# Patient Record
Sex: FEMALE | URBAN - METROPOLITAN AREA
[De-identification: names, ages, dates, MRNs, and addresses within clinical notes are randomized per-mention and may not be internally consistent; named-entity substitution may affect disease eponyms.]

---

## 2017-03-04 ENCOUNTER — EMERGENCY (EMERGENCY)
Facility: HOSPITAL | Age: 82
LOS: 1 days | Discharge: HOME | End: 2017-03-04
Attending: EMERGENCY MEDICINE | Admitting: EMERGENCY MEDICINE
Payer: MEDICARE

## 2017-03-04 VITALS
DIASTOLIC BLOOD PRESSURE: 84 MMHG | RESPIRATION RATE: 18 BRPM | SYSTOLIC BLOOD PRESSURE: 147 MMHG | OXYGEN SATURATION: 95 % | HEART RATE: 80 BPM | TEMPERATURE: 98 F

## 2017-03-04 VITALS
DIASTOLIC BLOOD PRESSURE: 81 MMHG | HEART RATE: 74 BPM | SYSTOLIC BLOOD PRESSURE: 161 MMHG | TEMPERATURE: 97 F | RESPIRATION RATE: 18 BRPM | OXYGEN SATURATION: 95 %

## 2017-03-04 DIAGNOSIS — Z79.899 OTHER LONG TERM (CURRENT) DRUG THERAPY: ICD-10-CM

## 2017-03-04 DIAGNOSIS — F91.8 OTHER CONDUCT DISORDERS: ICD-10-CM

## 2017-03-04 DIAGNOSIS — Z79.2 LONG TERM (CURRENT) USE OF ANTIBIOTICS: ICD-10-CM

## 2017-03-04 LAB
APPEARANCE UR: CLEAR — SIGNIFICANT CHANGE UP
BILIRUB UR-MCNC: NEGATIVE — SIGNIFICANT CHANGE UP
COLOR SPEC: YELLOW — SIGNIFICANT CHANGE UP
DIFF PNL FLD: NEGATIVE — SIGNIFICANT CHANGE UP
GLUCOSE UR QL: NEGATIVE — SIGNIFICANT CHANGE UP
KETONES UR-MCNC: NEGATIVE — SIGNIFICANT CHANGE UP
LEUKOCYTE ESTERASE UR-ACNC: NEGATIVE — SIGNIFICANT CHANGE UP
NITRITE UR-MCNC: NEGATIVE — SIGNIFICANT CHANGE UP
PCP SPEC-MCNC: SIGNIFICANT CHANGE UP
PH UR: 5.5 — SIGNIFICANT CHANGE UP (ref 4–8)
PROT UR-MCNC: NEGATIVE MG/DL — SIGNIFICANT CHANGE UP
SP GR SPEC: 1.01 — SIGNIFICANT CHANGE UP (ref 1–1.03)
UROBILINOGEN FLD QL: 0.2 E.U./DL — SIGNIFICANT CHANGE UP

## 2017-03-04 PROCEDURE — 99283 EMERGENCY DEPT VISIT LOW MDM: CPT | Mod: 25

## 2017-03-04 PROCEDURE — 99284 EMERGENCY DEPT VISIT MOD MDM: CPT

## 2017-03-04 PROCEDURE — 93005 ELECTROCARDIOGRAM TRACING: CPT

## 2017-03-04 RX ORDER — AMPICILLIN SODIUM AND SULBACTAM SODIUM 250; 125 MG/ML; MG/ML
INJECTION, POWDER, FOR SUSPENSION INTRAMUSCULAR; INTRAVENOUS
Qty: 0 | Refills: 0 | Status: DISCONTINUED | OUTPATIENT
Start: 2017-03-04 | End: 2017-03-04

## 2017-03-04 RX ORDER — AMPICILLIN SODIUM AND SULBACTAM SODIUM 250; 125 MG/ML; MG/ML
3 INJECTION, POWDER, FOR SUSPENSION INTRAMUSCULAR; INTRAVENOUS ONCE
Qty: 0 | Refills: 0 | Status: DISCONTINUED | OUTPATIENT
Start: 2017-03-04 | End: 2017-03-04

## 2017-03-04 NOTE — ED ADULT NURSE REASSESSMENT NOTE - NS ED NURSE REASSESS COMMENT FT1
Pt's home aide at bedside since 1700.  Ambulate to take pt home.  Ambulatory with even gait.  Pt at baseline status.

## 2017-03-04 NOTE — ED ADULT TRIAGE NOTE - CHIEF COMPLAINT QUOTE
Pt BIBA from home for aggressive behavior. As per EMS, pt has hx of dementia and 24hr home aide who pt was arguing with this morning. Pt is awake and oriented to self and place. Pt denies chest pain, dizziness, SOB. Pt taken to bed 18 for FS.

## 2017-03-04 NOTE — ED ADULT NURSE NOTE - OBJECTIVE STATEMENT
Pt BIBA for behavorial changes this morning. Per EMS 24 hours home health aide called 911 after Pt started cursing at aide and refused to eat.  Pt has hx of alzheimers and is baseline confused.  Pt is alert to person, place, not time.  Pt currently cooperative with ED staff, anxious and cursing in regards to her aide.  Pt currently denies any medical complaints.  Denies chest pain, SOB, abd pain, /GI symptoms, D/N/V.  Strong, equal handgrips.  Positive PMS x4 extremities. Pt BIBA for behavorial changes this morning. Per EMS 24 hours home health aide called 911 after Pt started cursing at aide and refused to eat.  Per EMS, aid states Pt has hx of alzheimer's disease and is baseline confused.  Pt is alert to person, place, not time.  Pt currently cooperative with ED staff, anxious and cursing in regards to her aide.  Pt currently denies any medical complaints.  Denies chest pain, SOB, abd pain, /GI symptoms, D/N/V.  Strong, equal hand .  Positive PMS x4 extremities.  Pt states "I threw a tantrum." Pt BIBA for behavorial changes this morning. Per EMS 24 hours home health aide called 911 after Pt started cursing at aide and refused to eat.  Per EMS, aid states Pt has hx of alzheimer's disease and is baseline confused.  Pt is alert to person, place, not time.  Pt currently cooperative with ED staff, anxious and cursing in regards to her aide.  Pt currently denies any medical complaints.  Denies chest pain, SOB, abd pain, /GI symptoms, D/N/V.  Strong, equal hand .  Positive PMS x4 extremities.  Pt states "I threw a tantrum."  Per EMS, aide refused to escort pt to ED.

## 2017-03-04 NOTE — ED PROVIDER NOTE - OBJECTIVE STATEMENT
89 yo with complaints of not getting along with aide 89 yo bibems with complaints of not getting along with aide this morning who is not regular aide during the week. Pt with no somatic complaints, now aide that is on evening shift states that pt is acting appropriately and is not combative and may have been because it was a not a regular aide. Pt denies shob, cp, abd pain and states otherwise feels fine, no hi/si, hallucinations.

## 2017-03-04 NOTE — ED PROVIDER NOTE - PHYSICAL EXAMINATION
CONSTITUTIONAL: Well appearing, well nourished, awake, alert and in no apparent distress.  ENMT: Airway patent.  HEAD: Head is atraumatic. Head shape is symmetrical.  EYES: Clear bilaterally. Normal EOMI.  CARDIAC: Normal rate, regular rhythm.  Heart sounds S1, S2.   RESPIRATORY: Breath sounds clear and equal bilaterally.  GASTROINTESTINAL: Abdomen soft, non-tender, no guarding.  MUSCULOSKELETAL: Spine appears normal, range of motion is not limited, no muscle or joint tenderness.   NEUROLOGICAL: Alert and oriented, no focal deficits, no motor or sensory deficits.  SKIN: Skin normal color for race, warm, dry and intact. No evidence of rash.  PSYCHIATRIC: Alert and oriented to person, place, time/situation. normal mood and affect. no apparent risk to self or others. not aggressive when talking to me or aide now. Does not appear altered/delirious.

## 2017-03-04 NOTE — ED ADULT NURSE NOTE - CHPI ED SYMPTOMS NEG
no fever/no decreased eating/drinking/no numbness/no pain/no chills/no nausea/no vomiting/no dizziness/no tingling/no weakness

## 2017-03-04 NOTE — ED PROVIDER NOTE - MEDICAL DECISION MAKING DETAILS
non toxic appearing, no somatic complaints, no SI/HI, not aggressive on my exam, refusing bld work initially, will defer for now. UA neg for UTI. Edenilson Acosta, will f/u with pt on Monday at 12p, no acute indication for psych eval now, Pt being brought to ED after not getting along with aide at home. Aide per current aide in ED is not there regularly. non toxic appearing, no somatic complaints, no SI/HI, not aggressive on my exam, refusing bld work initially, will defer for now. UA neg for UTI. Edenilson Acosta, will f/u with pt on Monday at 12p and pt is being dc'd with aide who is a regular.

## 2017-06-06 NOTE — ED ADULT NURSE NOTE - PRO INTERPRETER NEED 2
SUBJECTIVE: 76 year old female here for removal of sutures. Laceration  of RIGHT Foot occurred 12 days ago.  Sutures put in at [facility]. No   problems since then.     Nurse's notes reviewed and agree.      OBJECTIVE: Wound healed well. Sutures removed without complication.    ASSESSMENT: Suture Removal    PLAN: STERI STRIPS Applied and wound care instructions were provided. Follow up as needed.     English

## 2017-06-09 ENCOUNTER — EMERGENCY (EMERGENCY)
Facility: HOSPITAL | Age: 82
LOS: 1 days | Discharge: HOME | End: 2017-06-09
Attending: EMERGENCY MEDICINE | Admitting: EMERGENCY MEDICINE
Payer: MEDICARE

## 2017-06-09 VITALS
RESPIRATION RATE: 18 BRPM | SYSTOLIC BLOOD PRESSURE: 133 MMHG | OXYGEN SATURATION: 95 % | TEMPERATURE: 98 F | HEART RATE: 78 BPM | DIASTOLIC BLOOD PRESSURE: 80 MMHG

## 2017-06-09 DIAGNOSIS — Z79.2 LONG TERM (CURRENT) USE OF ANTIBIOTICS: ICD-10-CM

## 2017-06-09 DIAGNOSIS — R10.30 LOWER ABDOMINAL PAIN, UNSPECIFIED: ICD-10-CM

## 2017-06-09 DIAGNOSIS — Z79.899 OTHER LONG TERM (CURRENT) DRUG THERAPY: ICD-10-CM

## 2017-06-09 LAB
ALBUMIN SERPL ELPH-MCNC: 3.5 G/DL — SIGNIFICANT CHANGE UP (ref 3.3–5)
ALP SERPL-CCNC: 51 U/L — SIGNIFICANT CHANGE UP (ref 40–120)
ALT FLD-CCNC: 19 U/L — SIGNIFICANT CHANGE UP (ref 10–45)
ANION GAP SERPL CALC-SCNC: 11 MMOL/L — SIGNIFICANT CHANGE UP (ref 5–17)
APPEARANCE UR: CLEAR — SIGNIFICANT CHANGE UP
AST SERPL-CCNC: 23 U/L — SIGNIFICANT CHANGE UP (ref 10–40)
BASOPHILS NFR BLD AUTO: 0.3 % — SIGNIFICANT CHANGE UP (ref 0–2)
BILIRUB SERPL-MCNC: 0.3 MG/DL — SIGNIFICANT CHANGE UP (ref 0.2–1.2)
BILIRUB UR-MCNC: NEGATIVE — SIGNIFICANT CHANGE UP
BUN SERPL-MCNC: 17 MG/DL — SIGNIFICANT CHANGE UP (ref 7–23)
CALCIUM SERPL-MCNC: 9.2 MG/DL — SIGNIFICANT CHANGE UP (ref 8.4–10.5)
CHLORIDE SERPL-SCNC: 99 MMOL/L — SIGNIFICANT CHANGE UP (ref 96–108)
CO2 SERPL-SCNC: 27 MMOL/L — SIGNIFICANT CHANGE UP (ref 22–31)
COLOR SPEC: YELLOW — SIGNIFICANT CHANGE UP
CREAT SERPL-MCNC: 0.7 MG/DL — SIGNIFICANT CHANGE UP (ref 0.5–1.3)
DIFF PNL FLD: NEGATIVE — SIGNIFICANT CHANGE UP
EOSINOPHIL NFR BLD AUTO: 1 % — SIGNIFICANT CHANGE UP (ref 0–6)
GLUCOSE SERPL-MCNC: 82 MG/DL — SIGNIFICANT CHANGE UP (ref 70–99)
GLUCOSE UR QL: NEGATIVE — SIGNIFICANT CHANGE UP
HCT VFR BLD CALC: 42.4 % — SIGNIFICANT CHANGE UP (ref 34.5–45)
HGB BLD-MCNC: 13.8 G/DL — SIGNIFICANT CHANGE UP (ref 11.5–15.5)
KETONES UR-MCNC: NEGATIVE — SIGNIFICANT CHANGE UP
LEUKOCYTE ESTERASE UR-ACNC: NEGATIVE — SIGNIFICANT CHANGE UP
LYMPHOCYTES # BLD AUTO: 22.6 % — SIGNIFICANT CHANGE UP (ref 13–44)
MCHC RBC-ENTMCNC: 29.4 PG — SIGNIFICANT CHANGE UP (ref 27–34)
MCHC RBC-ENTMCNC: 32.5 G/DL — SIGNIFICANT CHANGE UP (ref 32–36)
MCV RBC AUTO: 90.2 FL — SIGNIFICANT CHANGE UP (ref 80–100)
MONOCYTES NFR BLD AUTO: 8.7 % — SIGNIFICANT CHANGE UP (ref 2–14)
NEUTROPHILS NFR BLD AUTO: 67.4 % — SIGNIFICANT CHANGE UP (ref 43–77)
NITRITE UR-MCNC: NEGATIVE — SIGNIFICANT CHANGE UP
PH UR: 7 — SIGNIFICANT CHANGE UP (ref 5–8)
PLATELET # BLD AUTO: 248 K/UL — SIGNIFICANT CHANGE UP (ref 150–400)
POTASSIUM SERPL-MCNC: 4.5 MMOL/L — SIGNIFICANT CHANGE UP (ref 3.5–5.3)
POTASSIUM SERPL-SCNC: 4.5 MMOL/L — SIGNIFICANT CHANGE UP (ref 3.5–5.3)
PROT SERPL-MCNC: 6.3 G/DL — SIGNIFICANT CHANGE UP (ref 6–8.3)
PROT UR-MCNC: NEGATIVE MG/DL — SIGNIFICANT CHANGE UP
RBC # BLD: 4.7 M/UL — SIGNIFICANT CHANGE UP (ref 3.8–5.2)
RBC # FLD: 14.3 % — SIGNIFICANT CHANGE UP (ref 10.3–16.9)
SODIUM SERPL-SCNC: 137 MMOL/L — SIGNIFICANT CHANGE UP (ref 135–145)
SP GR SPEC: <=1.005 — SIGNIFICANT CHANGE UP (ref 1–1.03)
UROBILINOGEN FLD QL: 0.2 E.U./DL — SIGNIFICANT CHANGE UP
WBC # BLD: 6.1 K/UL — SIGNIFICANT CHANGE UP (ref 3.8–10.5)
WBC # FLD AUTO: 6.1 K/UL — SIGNIFICANT CHANGE UP (ref 3.8–10.5)

## 2017-06-09 PROCEDURE — 81003 URINALYSIS AUTO W/O SCOPE: CPT

## 2017-06-09 PROCEDURE — 36415 COLL VENOUS BLD VENIPUNCTURE: CPT

## 2017-06-09 PROCEDURE — 84484 ASSAY OF TROPONIN QUANT: CPT

## 2017-06-09 PROCEDURE — 99284 EMERGENCY DEPT VISIT MOD MDM: CPT | Mod: 25

## 2017-06-09 PROCEDURE — 71010: CPT | Mod: 26

## 2017-06-09 PROCEDURE — 82550 ASSAY OF CK (CPK): CPT

## 2017-06-09 PROCEDURE — 74176 CT ABD & PELVIS W/O CONTRAST: CPT | Mod: 26

## 2017-06-09 PROCEDURE — 74176 CT ABD & PELVIS W/O CONTRAST: CPT

## 2017-06-09 PROCEDURE — 99284 EMERGENCY DEPT VISIT MOD MDM: CPT

## 2017-06-09 PROCEDURE — 85025 COMPLETE CBC W/AUTO DIFF WBC: CPT

## 2017-06-09 PROCEDURE — 82553 CREATINE MB FRACTION: CPT

## 2017-06-09 PROCEDURE — 71045 X-RAY EXAM CHEST 1 VIEW: CPT

## 2017-06-09 PROCEDURE — 80053 COMPREHEN METABOLIC PANEL: CPT

## 2017-06-09 RX ORDER — SODIUM CHLORIDE 9 MG/ML
1000 INJECTION INTRAMUSCULAR; INTRAVENOUS; SUBCUTANEOUS ONCE
Qty: 0 | Refills: 0 | Status: COMPLETED | OUTPATIENT
Start: 2017-06-09 | End: 2017-06-09

## 2017-06-09 RX ORDER — IOHEXOL 300 MG/ML
50 INJECTION, SOLUTION INTRAVENOUS ONCE
Qty: 0 | Refills: 0 | Status: COMPLETED | OUTPATIENT
Start: 2017-06-09 | End: 2017-06-09

## 2017-06-09 RX ADMIN — IOHEXOL 50 MILLILITER(S): 300 INJECTION, SOLUTION INTRAVENOUS at 20:44

## 2017-06-09 RX ADMIN — SODIUM CHLORIDE 1000 MILLILITER(S): 9 INJECTION INTRAMUSCULAR; INTRAVENOUS; SUBCUTANEOUS at 20:44

## 2017-06-09 NOTE — ED ADULT NURSE NOTE - OBJECTIVE STATEMENT
89 y/o female c/o small amount of bleeding from either rectum or vagina that happened last night and this morning. also c/o mild LLQ pain. pt denies any clots, large amoutns of blood loss, dizziness, headache, chest pain, fever/chills, recent falls, bloody/dark or tarry stools, n/v/d. no acute distress, speaking in full sentences. pt noted to have mild confusion as to situation. alert and oriented to self and time. EKG done, VS stable. 89 y/o female c/o small amount of bleeding from either rectum or vagina that happened last night and this morning. also c/o mild LLQ pain. pt denies any clots, large amoutns of blood loss, dizziness, headache, chest pain, fever/chills, recent falls, bloody/dark or tarry stools, n/v/d. no acute distress, speaking in full sentences. pt noted to have mild confusion as to situation. alert and oriented to self and time. PMH Alzheimers. EKG done, VS stable.

## 2017-06-09 NOTE — ED ADULT NURSE NOTE - CHIEF COMPLAINT QUOTE
c/o rectal bleeding vs vaginal bleeding that started yesterday. pt also c/o chest tightness and dysuria. Noted pt with disorientation.

## 2017-06-09 NOTE — ED ADULT TRIAGE NOTE - CHIEF COMPLAINT QUOTE
c/o rectal bleeding vs vaginal bleeding that started yesterday. pt also c/o chest tightness and dysuria. c/o rectal bleeding vs vaginal bleeding that started yesterday. pt also c/o chest tightness and dysuria. Noted pt with disorientation.

## 2017-06-10 VITALS
RESPIRATION RATE: 18 BRPM | TEMPERATURE: 98 F | SYSTOLIC BLOOD PRESSURE: 145 MMHG | DIASTOLIC BLOOD PRESSURE: 84 MMHG | OXYGEN SATURATION: 96 % | HEART RATE: 84 BPM

## 2017-06-10 NOTE — ED PROVIDER NOTE - MEDICAL DECISION MAKING DETAILS
s/s as above- discussed with dr. mercedes, requests to obtain ct a/p- presence of stool without acute process, less likely acute sbo/appendicitis, pt mainly describes diffuse cramping pain. will dc with milk of magnesia and fu with dr. mercedes on monday. s/s as above- discussed with dr. mercedes, referred pt to ED to obtain ct a/p.   - imaging reviewed- presence of stool without acute process, less likely acute sbo/appendicitis, pt mainly describes diffuse cramping pain. Vss, h/h stable. discussed with dr. mercedes,  will dc with milk of magnesia and fu with dr. hyatt on monday.

## 2017-06-10 NOTE — ED PROVIDER NOTE - CHPI ED SYMPTOMS NEG
no fever/no vomiting/no dysuria/no burning urination/no chills/no nausea/no abdominal distension/no hematuria/no diarrhea

## 2017-06-10 NOTE — ED PROVIDER NOTE - OBJECTIVE STATEMENT
89 yo hx of gerd presenting with complaints of lower abdominal pain described as cramping sensation ongoing for one week. no nausea, vomiting, dysuria, fever, chills, headache, shortness of breath. has not tried anything for symptoms, no other complaints.

## 2017-06-10 NOTE — ED PROVIDER NOTE - PHYSICAL EXAMINATION
CONSTITUTIONAL: Well appearing, well nourished, awake, alert and in no apparent distress.  HEENT: Head is atraumatic. Eyes clear bilaterally, normal EOMI. Airway patent.  CARDIAC: Normal rate, regular rhythm.  Heart sounds S1, S2.   RESPIRATORY: Breath sounds clear and equal bilaterally.  GASTROINTESTINAL: Abdomen soft, non-tender, no guarding.  MUSCULOSKELETAL: Spine appears normal, range of motion is not limited, no muscle or joint tenderness.   NEUROLOGICAL: Alert and oriented, no focal deficits, no motor or sensory deficits.  SKIN: Skin normal color for race, warm, dry and intact. No evidence of rash.  PSYCHIATRIC: Alert and oriented to person, place, time/situation. normal mood and affect. no apparent risk to self or others.  : no active rectal bleeding, no blood/stool in vault. no blood in vaginal area either.

## 2017-06-10 NOTE — ED PROVIDER NOTE - PROGRESS NOTE DETAILS
Pt non tolerating oral contrast. Additionally had multiple people help with CT, pt w difficulty lying flat and resisting, aide states able to lie flat at home- suspect hard surface of scanner table made it difficult.  had props/soft cushions to support pt also, hence wo iv to limit time spent for CT given pt discomfort.

## 2017-07-27 ENCOUNTER — EMERGENCY (EMERGENCY)
Facility: HOSPITAL | Age: 82
LOS: 1 days | Discharge: HOME | End: 2017-07-27
Attending: EMERGENCY MEDICINE | Admitting: EMERGENCY MEDICINE
Payer: MEDICARE

## 2017-07-27 VITALS
SYSTOLIC BLOOD PRESSURE: 118 MMHG | HEART RATE: 74 BPM | RESPIRATION RATE: 18 BRPM | OXYGEN SATURATION: 95 % | DIASTOLIC BLOOD PRESSURE: 71 MMHG

## 2017-07-27 VITALS
HEART RATE: 66 BPM | SYSTOLIC BLOOD PRESSURE: 109 MMHG | DIASTOLIC BLOOD PRESSURE: 65 MMHG | RESPIRATION RATE: 18 BRPM | TEMPERATURE: 98 F | WEIGHT: 117.73 LBS | OXYGEN SATURATION: 96 %

## 2017-07-27 LAB
ALBUMIN SERPL ELPH-MCNC: 3.8 G/DL — SIGNIFICANT CHANGE UP (ref 3.3–5)
ALP SERPL-CCNC: 48 U/L — SIGNIFICANT CHANGE UP (ref 40–120)
ALT FLD-CCNC: 17 U/L — SIGNIFICANT CHANGE UP (ref 10–45)
ANION GAP SERPL CALC-SCNC: 8 MMOL/L — SIGNIFICANT CHANGE UP (ref 5–17)
APPEARANCE UR: CLEAR — SIGNIFICANT CHANGE UP
APTT BLD: 29.9 SEC — SIGNIFICANT CHANGE UP (ref 27.5–37.4)
AST SERPL-CCNC: 23 U/L — SIGNIFICANT CHANGE UP (ref 10–40)
BASOPHILS NFR BLD AUTO: 0.1 % — SIGNIFICANT CHANGE UP (ref 0–2)
BILIRUB SERPL-MCNC: 0.2 MG/DL — SIGNIFICANT CHANGE UP (ref 0.2–1.2)
BILIRUB UR-MCNC: NEGATIVE — SIGNIFICANT CHANGE UP
BUN SERPL-MCNC: 24 MG/DL — HIGH (ref 7–23)
CALCIUM SERPL-MCNC: 9.3 MG/DL — SIGNIFICANT CHANGE UP (ref 8.4–10.5)
CHLORIDE SERPL-SCNC: 101 MMOL/L — SIGNIFICANT CHANGE UP (ref 96–108)
CK MB CFR SERPL CALC: 2.5 NG/ML — SIGNIFICANT CHANGE UP (ref 0–6.7)
CK SERPL-CCNC: 61 U/L — SIGNIFICANT CHANGE UP (ref 25–170)
CO2 SERPL-SCNC: 30 MMOL/L — SIGNIFICANT CHANGE UP (ref 22–31)
COLOR SPEC: YELLOW — SIGNIFICANT CHANGE UP
CREAT SERPL-MCNC: 0.7 MG/DL — SIGNIFICANT CHANGE UP (ref 0.5–1.3)
DIFF PNL FLD: NEGATIVE — SIGNIFICANT CHANGE UP
EOSINOPHIL NFR BLD AUTO: 1.1 % — SIGNIFICANT CHANGE UP (ref 0–6)
GLUCOSE SERPL-MCNC: 139 MG/DL — HIGH (ref 70–99)
GLUCOSE UR QL: NEGATIVE — SIGNIFICANT CHANGE UP
HCT VFR BLD CALC: 43.3 % — SIGNIFICANT CHANGE UP (ref 34.5–45)
HGB BLD-MCNC: 13.6 G/DL — SIGNIFICANT CHANGE UP (ref 11.5–15.5)
INR BLD: 1.04 — SIGNIFICANT CHANGE UP (ref 0.88–1.16)
KETONES UR-MCNC: NEGATIVE — SIGNIFICANT CHANGE UP
LEUKOCYTE ESTERASE UR-ACNC: NEGATIVE — SIGNIFICANT CHANGE UP
LYMPHOCYTES # BLD AUTO: 20.2 % — SIGNIFICANT CHANGE UP (ref 13–44)
MCHC RBC-ENTMCNC: 29.9 PG — SIGNIFICANT CHANGE UP (ref 27–34)
MCHC RBC-ENTMCNC: 31.4 G/DL — LOW (ref 32–36)
MCV RBC AUTO: 95.2 FL — SIGNIFICANT CHANGE UP (ref 80–100)
MONOCYTES NFR BLD AUTO: 6.9 % — SIGNIFICANT CHANGE UP (ref 2–14)
NEUTROPHILS NFR BLD AUTO: 71.7 % — SIGNIFICANT CHANGE UP (ref 43–77)
NITRITE UR-MCNC: NEGATIVE — SIGNIFICANT CHANGE UP
PH UR: 6.5 — SIGNIFICANT CHANGE UP (ref 5–8)
PLATELET # BLD AUTO: 210 K/UL — SIGNIFICANT CHANGE UP (ref 150–400)
POTASSIUM SERPL-MCNC: 4.4 MMOL/L — SIGNIFICANT CHANGE UP (ref 3.5–5.3)
POTASSIUM SERPL-SCNC: 4.4 MMOL/L — SIGNIFICANT CHANGE UP (ref 3.5–5.3)
PROT SERPL-MCNC: 6.5 G/DL — SIGNIFICANT CHANGE UP (ref 6–8.3)
PROT UR-MCNC: NEGATIVE MG/DL — SIGNIFICANT CHANGE UP
PROTHROM AB SERPL-ACNC: 11.5 SEC — SIGNIFICANT CHANGE UP (ref 9.8–12.7)
RBC # BLD: 4.55 M/UL — SIGNIFICANT CHANGE UP (ref 3.8–5.2)
RBC # FLD: 13.8 % — SIGNIFICANT CHANGE UP (ref 10.3–16.9)
SODIUM SERPL-SCNC: 139 MMOL/L — SIGNIFICANT CHANGE UP (ref 135–145)
SP GR SPEC: <=1.005 — SIGNIFICANT CHANGE UP (ref 1–1.03)
TROPONIN T SERPL-MCNC: <0.01 NG/ML — SIGNIFICANT CHANGE UP (ref 0–0.01)
UROBILINOGEN FLD QL: 0.2 E.U./DL — SIGNIFICANT CHANGE UP
WBC # BLD: 7.3 K/UL — SIGNIFICANT CHANGE UP (ref 3.8–10.5)
WBC # FLD AUTO: 7.3 K/UL — SIGNIFICANT CHANGE UP (ref 3.8–10.5)

## 2017-07-27 PROCEDURE — 93010 ELECTROCARDIOGRAM REPORT: CPT

## 2017-07-27 PROCEDURE — 71010: CPT | Mod: 26

## 2017-07-27 PROCEDURE — 99285 EMERGENCY DEPT VISIT HI MDM: CPT | Mod: 25

## 2017-07-27 RX ORDER — SERTRALINE 25 MG/1
25 TABLET, FILM COATED ORAL DAILY
Qty: 0 | Refills: 0 | Status: DISCONTINUED | OUTPATIENT
Start: 2017-07-27 | End: 2017-07-31

## 2017-07-27 RX ORDER — SODIUM CHLORIDE 9 MG/ML
1000 INJECTION INTRAMUSCULAR; INTRAVENOUS; SUBCUTANEOUS
Qty: 0 | Refills: 0 | Status: DISCONTINUED | OUTPATIENT
Start: 2017-07-27 | End: 2017-07-31

## 2017-07-27 RX ADMIN — SERTRALINE 25 MILLIGRAM(S): 25 TABLET, FILM COATED ORAL at 22:49

## 2017-07-27 RX ADMIN — SODIUM CHLORIDE 125 MILLILITER(S): 9 INJECTION INTRAMUSCULAR; INTRAVENOUS; SUBCUTANEOUS at 19:59

## 2017-07-27 NOTE — ED ADULT NURSE NOTE - OBJECTIVE STATEMENT
Pt is alert and oriented to self and place, not to time. Pt states "I've been feeling weak all over for weeks". Pt denies pain, SOB, fever, GI or  symptoms. Pt states she thinks she fell a month ago at home, denies LOC or head injury. Pt also states she's been having more difficulty ambulating. Pt states she lives alone, has an aide at home. Pt's medication list includes Folic Acid, Aricept and Zoloft and Quetiapine Fumarate. NAD at this time. Side rails up, aide at bedside. Will continue to monitor.

## 2017-07-27 NOTE — ED PROVIDER NOTE - MEDICAL DECISION MAKING DETAILS
89 F with GERD with vague complaints congestion  poor appetite  x 2 weeks - poor oral intake 89 F with GERD with vague complaints congestion appetite ok malaika po well mild BUN elevation  labs unremarkable VSS may dc- to see dr porter in am 7/28

## 2017-07-27 NOTE — ED ADULT NURSE REASSESSMENT NOTE - NS ED NURSE REASSESS COMMENT FT1
pt sitting in wheelchair. aid at chair side. awaiting transportation home.
pt resting in stretcher. home health aid at bedside. states no new complaints. Will continue to monitor

## 2017-07-27 NOTE — ED PROVIDER NOTE - OBJECTIVE STATEMENT
89 F with hx of Alzheimers, GERD, c/o of generalized fatigue x 2 weeks - was recently placed on antibiotic for UTI- denies any fever or chills-  no cough  mild facial congestion-- no N/V or diarrhea - no flank pain- also slight headache- no neck stiffness  no sore throat  appetite decreased over the past week

## 2017-07-27 NOTE — ED PROVIDER NOTE - PROGRESS NOTE DETAILS
pt refusing CT alert awake malaika po well-  dw  dr porter -- may dc -- u/a neg -CXR neg   -alert no distress will dc

## 2017-07-28 ENCOUNTER — INPATIENT (INPATIENT)
Facility: HOSPITAL | Age: 82
LOS: 6 days | Discharge: NURSING, REHAB OR LONG-TERM CARE | DRG: 57 | End: 2017-08-04
Attending: INTERNAL MEDICINE | Admitting: INTERNAL MEDICINE
Payer: MEDICARE

## 2017-07-28 VITALS
RESPIRATION RATE: 18 BRPM | OXYGEN SATURATION: 95 % | HEART RATE: 99 BPM | DIASTOLIC BLOOD PRESSURE: 104 MMHG | SYSTOLIC BLOOD PRESSURE: 172 MMHG | TEMPERATURE: 98 F

## 2017-07-28 PROCEDURE — 93010 ELECTROCARDIOGRAM REPORT: CPT

## 2017-07-28 PROCEDURE — 99285 EMERGENCY DEPT VISIT HI MDM: CPT | Mod: 25

## 2017-07-29 DIAGNOSIS — Z66 DO NOT RESUSCITATE: ICD-10-CM

## 2017-07-29 DIAGNOSIS — Z29.9 ENCOUNTER FOR PROPHYLACTIC MEASURES, UNSPECIFIED: ICD-10-CM

## 2017-07-29 DIAGNOSIS — G30.9 ALZHEIMER'S DISEASE, UNSPECIFIED: ICD-10-CM

## 2017-07-29 DIAGNOSIS — F03.90 UNSPECIFIED DEMENTIA, UNSPECIFIED SEVERITY, WITHOUT BEHAVIORAL DISTURBANCE, PSYCHOTIC DISTURBANCE, MOOD DISTURBANCE, AND ANXIETY: ICD-10-CM

## 2017-07-29 DIAGNOSIS — R63.8 OTHER SYMPTOMS AND SIGNS CONCERNING FOOD AND FLUID INTAKE: ICD-10-CM

## 2017-07-29 DIAGNOSIS — R45.89 OTHER SYMPTOMS AND SIGNS INVOLVING EMOTIONAL STATE: ICD-10-CM

## 2017-07-29 LAB
ALBUMIN SERPL ELPH-MCNC: 3.7 G/DL — SIGNIFICANT CHANGE UP (ref 3.3–5)
ALP SERPL-CCNC: 48 U/L — SIGNIFICANT CHANGE UP (ref 40–120)
ALT FLD-CCNC: 19 U/L — SIGNIFICANT CHANGE UP (ref 10–45)
ANION GAP SERPL CALC-SCNC: 10 MMOL/L — SIGNIFICANT CHANGE UP (ref 5–17)
AST SERPL-CCNC: 25 U/L — SIGNIFICANT CHANGE UP (ref 10–40)
BASOPHILS NFR BLD AUTO: 0.4 % — SIGNIFICANT CHANGE UP (ref 0–2)
BILIRUB SERPL-MCNC: 0.4 MG/DL — SIGNIFICANT CHANGE UP (ref 0.2–1.2)
BUN SERPL-MCNC: 19 MG/DL — SIGNIFICANT CHANGE UP (ref 7–23)
CALCIUM SERPL-MCNC: 9.4 MG/DL — SIGNIFICANT CHANGE UP (ref 8.4–10.5)
CHLORIDE SERPL-SCNC: 103 MMOL/L — SIGNIFICANT CHANGE UP (ref 96–108)
CO2 SERPL-SCNC: 29 MMOL/L — SIGNIFICANT CHANGE UP (ref 22–31)
CREAT SERPL-MCNC: 0.7 MG/DL — SIGNIFICANT CHANGE UP (ref 0.5–1.3)
CULTURE RESULTS: NO GROWTH — SIGNIFICANT CHANGE UP
EOSINOPHIL NFR BLD AUTO: 2.1 % — SIGNIFICANT CHANGE UP (ref 0–6)
GLUCOSE SERPL-MCNC: 91 MG/DL — SIGNIFICANT CHANGE UP (ref 70–99)
HCT VFR BLD CALC: 42.7 % — SIGNIFICANT CHANGE UP (ref 34.5–45)
HGB BLD-MCNC: 13.6 G/DL — SIGNIFICANT CHANGE UP (ref 11.5–15.5)
LYMPHOCYTES # BLD AUTO: 35.7 % — SIGNIFICANT CHANGE UP (ref 13–44)
MCHC RBC-ENTMCNC: 30.2 PG — SIGNIFICANT CHANGE UP (ref 27–34)
MCHC RBC-ENTMCNC: 31.9 G/DL — LOW (ref 32–36)
MCV RBC AUTO: 94.9 FL — SIGNIFICANT CHANGE UP (ref 80–100)
MONOCYTES NFR BLD AUTO: 10 % — SIGNIFICANT CHANGE UP (ref 2–14)
NEUTROPHILS NFR BLD AUTO: 51.8 % — SIGNIFICANT CHANGE UP (ref 43–77)
PLATELET # BLD AUTO: 210 K/UL — SIGNIFICANT CHANGE UP (ref 150–400)
POTASSIUM SERPL-MCNC: 3.6 MMOL/L — SIGNIFICANT CHANGE UP (ref 3.5–5.3)
POTASSIUM SERPL-SCNC: 3.6 MMOL/L — SIGNIFICANT CHANGE UP (ref 3.5–5.3)
PROT SERPL-MCNC: 6.6 G/DL — SIGNIFICANT CHANGE UP (ref 6–8.3)
RBC # BLD: 4.5 M/UL — SIGNIFICANT CHANGE UP (ref 3.8–5.2)
RBC # FLD: 13.5 % — SIGNIFICANT CHANGE UP (ref 10.3–16.9)
SODIUM SERPL-SCNC: 142 MMOL/L — SIGNIFICANT CHANGE UP (ref 135–145)
SPECIMEN SOURCE: SIGNIFICANT CHANGE UP
WBC # BLD: 5.3 K/UL — SIGNIFICANT CHANGE UP (ref 3.8–10.5)
WBC # FLD AUTO: 5.3 K/UL — SIGNIFICANT CHANGE UP (ref 3.8–10.5)

## 2017-07-29 PROCEDURE — 93010 ELECTROCARDIOGRAM REPORT: CPT

## 2017-07-29 PROCEDURE — 70450 CT HEAD/BRAIN W/O DYE: CPT | Mod: 26

## 2017-07-29 RX ORDER — HEPARIN SODIUM 5000 [USP'U]/ML
5000 INJECTION INTRAVENOUS; SUBCUTANEOUS EVERY 8 HOURS
Qty: 0 | Refills: 0 | Status: DISCONTINUED | OUTPATIENT
Start: 2017-07-29 | End: 2017-08-04

## 2017-07-29 RX ORDER — OLANZAPINE 15 MG/1
5 TABLET, FILM COATED ORAL AT BEDTIME
Qty: 0 | Refills: 0 | Status: DISCONTINUED | OUTPATIENT
Start: 2017-07-29 | End: 2017-07-30

## 2017-07-29 RX ORDER — FOLIC ACID 0.8 MG
1 TABLET ORAL DAILY
Qty: 0 | Refills: 0 | Status: DISCONTINUED | OUTPATIENT
Start: 2017-07-29 | End: 2017-08-04

## 2017-07-29 RX ORDER — OLANZAPINE 15 MG/1
2.5 TABLET, FILM COATED ORAL AT BEDTIME
Qty: 0 | Refills: 0 | Status: DISCONTINUED | OUTPATIENT
Start: 2017-07-29 | End: 2017-07-29

## 2017-07-29 RX ADMIN — Medication 2 MILLIGRAM(S): at 00:29

## 2017-07-29 RX ADMIN — OLANZAPINE 5 MILLIGRAM(S): 15 TABLET, FILM COATED ORAL at 22:39

## 2017-07-29 RX ADMIN — Medication 2 MILLIGRAM(S): at 10:47

## 2017-07-29 RX ADMIN — HEPARIN SODIUM 5000 UNIT(S): 5000 INJECTION INTRAVENOUS; SUBCUTANEOUS at 22:39

## 2017-07-29 RX ADMIN — Medication 2 MILLIGRAM(S): at 06:23

## 2017-07-29 NOTE — ED ADULT NURSE NOTE - OBJECTIVE STATEMENT
hand off received from BRANDAN Michaels as per MIRIAM, pt was combative at home to aid and to staff in the ed. pt received 2mg ativan IM by MIRIAM Polk. pt noted to be sleeping on stretcher with 1:1 tech at bedside along with pts aid. equal and b.l chest rises with unlabored breathing noted, safety precautions maintained with side railings up. will continue to monitor.

## 2017-07-29 NOTE — ED PROVIDER NOTE - MEDICAL DECISION MAKING DETAILS
aggressive, agitated, had lab workup yesterday, hx of alzheimers, given ativan for sedation  -check ct head, reassess

## 2017-07-29 NOTE — H&P ADULT - NSHPPHYSICALEXAM_GEN_ALL_CORE
General: No acute distress. Uncooperative  Head: Normocephalic; atraumatic  Eyes: PERRL, EOM intact; conjunctiva and sclera clear  ENMT: No nasal discharge; airway clear  Neck: Supple; non tender; no masses  Respiratory: No wheezes, rales or rhonchi  Cardiovascular: Regular rate and rhythm. S1 and S2 Normal; No murmurs, gallops or rubs  Gastrointestinal: Soft non-tender non-distended; Normal bowel sounds; No hepatosplenomegaly  Genitourinary: No costovertebral angle tenderness  Extremities: Normal range of motion, No clubbing, cyanosis or edema  MSK: FROM of all joints, no joint swelling.  Skin: Intact. No rash or lesion

## 2017-07-29 NOTE — ED PROVIDER NOTE - PROGRESS NOTE DETAILS
pt resting comfortably in ED, no acute abnormalities on CT. pt discharged home with 24hr HHA. spoke with HCP Annamarie Barlow [phone] - very concerned that pt cannot be at home with aid alone. concern for aid's safety as pt has become increasingly aggressive and dangerous. spoke with Dr. Perez, will admit to his service pt resting comfortably in ED, no acute abnormalities on CT.
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
## 2017-07-29 NOTE — H&P ADULT - NSHPLABSRESULTS_GEN_ALL_CORE
13.6   5.3   )-----------( 210      ( 29 Jul 2017 07:41 )             42.7   07-29    142  |  103  |  19  ----------------------------<  91  3.6   |  29  |  0.70    Ca    9.4      29 Jul 2017 07:41    TPro  6.6  /  Alb  3.7  /  TBili  0.4  /  DBili  x   /  AST  25  /  ALT  19  /  AlkPhos  48  07-29    < from: CT Head No Cont (07.29.17 @ 01:34) >    FINDINGS:    VENTRICLES AND SULCI: Generalized cerebral volume loss .  Prominence of   the ventricles secondary to periventricular volume loss loss. This is not   significantly changed as compared to 12/30/2016.  INTRA-AXIAL: No acute intracranial hemorrhage or midline shift is   present. Gray-white differentiation is preserved. There are patchy   periventricular hypodensities, which is consistent with the sequela of   chronic microangiopathic ischemic disease.  EXTRA-AXIAL: No extra-axial fluid collection is present.   VISUALIZED SINUSES: The visualized paranasal sinuses are predominantly   clear.  VISUALIZED MASTOIDS:  Clear.  CALVARIUM:  Normal.  MISCELLANEOUS:  None.    IMPRESSION:  No acute intracranial hemorrhage or acute transcortical   infarction.    < end of copied text > 13.6   5.3   )-----------( 210      ( 29 Jul 2017 07:41 )             42.7   07-29    142  |  103  |  19  ----------------------------<  91  3.6   |  29  |  0.70    Ca    9.4      29 Jul 2017 07:41    TPro  6.6  /  Alb  3.7  /  TBili  0.4  /  DBili  x   /  AST  25  /  ALT  19  /  AlkPhos  48  07-29    EKG: NSR with     < from: CT Head No Cont (07.29.17 @ 01:34) >    FINDINGS:    VENTRICLES AND SULCI: Generalized cerebral volume loss .  Prominence of   the ventricles secondary to periventricular volume loss loss. This is not   significantly changed as compared to 12/30/2016.  INTRA-AXIAL: No acute intracranial hemorrhage or midline shift is   present. Gray-white differentiation is preserved. There are patchy   periventricular hypodensities, which is consistent with the sequela of   chronic microangiopathic ischemic disease.  EXTRA-AXIAL: No extra-axial fluid collection is present.   VISUALIZED SINUSES: The visualized paranasal sinuses are predominantly   clear.  VISUALIZED MASTOIDS:  Clear.  CALVARIUM:  Normal.  MISCELLANEOUS:  None.    IMPRESSION:  No acute intracranial hemorrhage or acute transcortical   infarction.    < end of copied text >
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
## 2017-07-29 NOTE — H&P ADULT - PROBLEM SELECTOR PLAN 1
Elderly female with Alzheimer's with paranoia Elderly female with Alzheimer's with paranoia here with worsening delusions, ruled out for stroke or ruled out for stroke or toxic metabolic encephalopathy. Pt has been living at home with 24h care but due to her behavioural disturbances, the agency does not feel it is safe to have her home with aide. EKG with normal QT  - Ativan 1mg IV push PRN for agitation.  - Can give Haldol PRN for combative behavior  - Long term care placement for patients with dementia  - Will limit blood draws   - EKG daily Elderly female with Alzheimer's with paranoia here with worsening delusions, ruled out for stroke or ruled out for stroke or toxic metabolic encephalopathy. Pt has been living at home with 24h care but due to her behavioural disturbances, the agency does not feel it is safe to have her home with aide. EKG with normal QT  - Ativan 1mg IV push PRN for agitation.  - Can give Haldol PRN for combative behavior  - Continue Zyprexa 2.5mg PO at bedtime  - Long term care placement for patients with dementia  - Will limit blood draws   - EKG daily

## 2017-07-29 NOTE — H&P ADULT - ASSESSMENT
89yo woman with advanced dementia, GERD here with increasing agitation ruled out for stroke or toxic metabolic encephalopathy being admitted for worsening dementia and placement to a long term care facility for dementia 89yo woman with advanced dementia, GERD here with increasing agitation being admitted for worsening dementia and placement to a long term care facility for dementia

## 2017-07-30 PROCEDURE — 93010 ELECTROCARDIOGRAM REPORT: CPT

## 2017-07-30 RX ORDER — OLANZAPINE 15 MG/1
2.5 TABLET, FILM COATED ORAL
Qty: 0 | Refills: 0 | Status: DISCONTINUED | OUTPATIENT
Start: 2017-07-30 | End: 2017-08-04

## 2017-07-30 RX ORDER — HALOPERIDOL DECANOATE 100 MG/ML
1 INJECTION INTRAMUSCULAR EVERY 4 HOURS
Qty: 0 | Refills: 0 | Status: DISCONTINUED | OUTPATIENT
Start: 2017-07-30 | End: 2017-08-04

## 2017-07-30 RX ORDER — OLANZAPINE 15 MG/1
2.5 TABLET, FILM COATED ORAL AT BEDTIME
Qty: 0 | Refills: 0 | Status: DISCONTINUED | OUTPATIENT
Start: 2017-07-30 | End: 2017-08-03

## 2017-07-30 RX ORDER — HALOPERIDOL DECANOATE 100 MG/ML
1 INJECTION INTRAMUSCULAR ONCE
Qty: 0 | Refills: 0 | Status: COMPLETED | OUTPATIENT
Start: 2017-07-30 | End: 2017-07-30

## 2017-07-30 RX ADMIN — HEPARIN SODIUM 5000 UNIT(S): 5000 INJECTION INTRAVENOUS; SUBCUTANEOUS at 22:28

## 2017-07-30 RX ADMIN — OLANZAPINE 2.5 MILLIGRAM(S): 15 TABLET, FILM COATED ORAL at 22:28

## 2017-07-30 RX ADMIN — HEPARIN SODIUM 5000 UNIT(S): 5000 INJECTION INTRAVENOUS; SUBCUTANEOUS at 06:44

## 2017-07-30 RX ADMIN — HALOPERIDOL DECANOATE 1 MILLIGRAM(S): 100 INJECTION INTRAMUSCULAR at 03:37

## 2017-07-30 RX ADMIN — Medication 1 TABLET(S): at 11:48

## 2017-07-30 RX ADMIN — HALOPERIDOL DECANOATE 1 MILLIGRAM(S): 100 INJECTION INTRAMUSCULAR at 08:03

## 2017-07-30 RX ADMIN — Medication 1 MILLIGRAM(S): at 11:48

## 2017-07-30 NOTE — PHYSICAL THERAPY INITIAL EVALUATION ADULT - GENERAL OBSERVATIONS, REHAB EVAL
Patient received supine (+) heplock private aide present no apparent distress Patient received supine (+) heplock, private aide present no apparent distress

## 2017-07-30 NOTE — PROGRESS NOTE ADULT - PROBLEM SELECTOR PLAN 1
-Ruled out for stroke or ruled out for stroke or toxic metabolic encephalopathy.  - EKG with normal QT  - Can give Haldol 1mg PRN for combative behavior  -Awaiting Neuro and Psych evaluation  - Continue Zyprexa 2.5mg PO at bedtime  - Awaiting long term care placement for patients with dementia  - Will limit blood draws   - EKG daily
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
## 2017-07-30 NOTE — PHYSICAL THERAPY INITIAL EVALUATION ADULT - ORIENTATION, REHAB EVAL
time/person/place/Identified [facility] with min verbal cueing, did not identify year or day of the month, Stated "July", did not elaborate on situation that brought her to the hospital.

## 2017-07-30 NOTE — CHART NOTE - NSCHARTNOTEFT_GEN_A_CORE
Called by RN about patient being agitated. Went and assessed. Pt was disoriented but non-combative. Reoriented the patient and adjusted bed/pillow position and turned off the light and the patient fell back asleep. No medication for agitation necessary at this time. Called by RN ~2:30am about patient being agitated. Went and assessed. Pt was disoriented but non-combative. Reoriented the patient and adjusted bed/pillow position and turned off the light and the patient fell back asleep. No medication for agitation necessary at this time.    Called again by RN ~3:30 am. Patient no longer able to be reoriented. Agitated, removed clothes and patient ID band and was attempting to get out of bed with guard rail up. 1mg Haldol IV given with good response.

## 2017-07-30 NOTE — BEHAVIORAL HEALTH ASSESSMENT NOTE - ORIENTATION OTHER
A&Ox1. Thinks we are in the hospital, year (70s or 80s), does not know why she is here but able to recount yesterday's events

## 2017-07-30 NOTE — PHYSICAL THERAPY INITIAL EVALUATION ADULT - PLANNED THERAPY INTERVENTIONS, PT EVAL
strengthening/gait training, balance training/postural re-education/motor coordination training/ROM/transfer training

## 2017-07-30 NOTE — PHYSICAL THERAPY INITIAL EVALUATION ADULT - COORDINATION ASSESSED, REHAB EVAL
finger-nose impaired bilateral (+) dysmetria, difficulty following directives of exam, heel-shin with dysmetria bilateral

## 2017-07-30 NOTE — PHYSICAL THERAPY INITIAL EVALUATION ADULT - ADDITIONAL COMMENTS
Has a straight cane and rolling walker she does not use, has 24 hour aides at home, utilizes wheelchair for long distances while going out. no steps upon entry. Has a straight cane and rolling walker she does not use, has 24 hour aides at home, utilizes wheelchair for long distances while going out. no steps upon entry. 1 fall over 10 months ago in her kitchen. Patient previously independent with ADL's and ambulation , did not quantify amount before requiring break.

## 2017-07-30 NOTE — PHYSICAL THERAPY INITIAL EVALUATION ADULT - CRITERIA FOR SKILLED THERAPEUTIC INTERVENTIONS
rehab potential/therapy frequency/risk reduction/prevention/functional limitations in following categories/anticipated discharge recommendation/impairments found

## 2017-07-30 NOTE — PROGRESS NOTE ADULT - SUBJECTIVE AND OBJECTIVE BOX
PGY-1 Progress Note    SUBJECTIVE:    Patient is a 89y old  Female who presents with a chief complaint of Agitation (29 Jul 2017 11:57)      Interval HPI:  Patient was agitated and confused this morning. She was saying it was nighttime and wanted to walk around. She was in no acute distress    Review of Systems:  Consitutional: no malaise, fatigue, fevers  HEENT: no headache, sore throat, rhinorrhea  Respiratory: no cough, SOB, chest pain  Cardiac: no palpitations, chest pain  Vascular: no leg swelling  Gastrointestinal: no nausea, vomiting, diarrhea or constipation  Genitourinary: no dysuria, nocturia or polyuria  MSK: no joint or muscle pain  Skin: no rashes  Neuro: no headaches, focal weakness or numbness  Psych: no depression or anxiety      OBJECTIVE:    Vital Signs Last 24 Hrs  T(C): 36.4 (30 Jul 2017 05:47), Max: 36.7 (29 Jul 2017 10:51)  T(F): 97.5 (30 Jul 2017 05:47), Max: 98 (29 Jul 2017 10:51)  HR: 83 (30 Jul 2017 05:47) (56 - 83)  BP: 148/88 (30 Jul 2017 05:47) (134/60 - 169/74)  BP(mean): --  RR: 15 (30 Jul 2017 05:47) (14 - 16)  SpO2: 96% (30 Jul 2017 05:47) (94% - 96%)    Height (cm): 170.18 (07-29 @ 13:06)  Weight (kg): 54.5 (07-29 @ 13:06)  BMI (kg/m2): 18.8 (07-29 @ 13:06)    CAPILLARY BLOOD GLUCOSE        Physical Exam:  General: No acute distress. Uncooperative. Agitated  Head: Normocephalic; atraumatic  Eyes: PERRL, EOM intact; conjunctiva and sclera clear. Limited eye opening upon exam.  ENMT: Moist mucus membranes, No nasal discharge; airway clear  Neck: Supple; non tender; no masses  Respiratory: No wheezes, rales or rhonchi  Cardiovascular: Regular rate and rhythm. S1 and S2 Normal; No murmurs, gallops or rubs  Gastrointestinal: Soft non-tender non-distended; Normal bowel sounds; No hepatosplenomegaly  Genitourinary: No costovertebral angle tenderness  Extremities: Normal range of motion, No clubbing, cyanosis or edema  MSK: PROM of all joints, no joint swelling.  Skin: Intact. No rash or lesion    MEDICATIONS  (STANDING):  multivitamin 1 Tablet(s) Oral daily  folic acid 1 milliGRAM(s) Oral daily  heparin  Injectable 5000 Unit(s) SubCutaneous every 8 hours  OLANZapine 5 milliGRAM(s) Oral at bedtime    MEDICATIONS  (PRN):  haloperidol    Injectable 1 milliGRAM(s) IntraMuscular every 4 hours PRN agitation      Allergies:  Allergies    No Known Allergies    Intolerances        Labs:                        13.6   5.3   )-----------( 210      ( 29 Jul 2017 07:41 )             42.7      07-29    142  |  103  |  19  ----------------------------<  91  3.6   |  29  |  0.70    Ca    9.4      29 Jul 2017 07:41    TPro  6.6  /  Alb  3.7  /  TBili  0.4  /  DBili  x   /  AST  25  /  ALT  19  /  AlkPhos  48  07-29          Microbiology:      Radiology: PGY-1 Progress Note    SUBJECTIVE:    Patient is a 89y old  Female who presents with a chief complaint of Agitation (29 Jul 2017 11:57)      Interval HPI:  Patient was agitated and confused this morning. She was saying it was nighttime and wanted to walk around. She was in no acute distress this morning. Psych has been consulted and will see her today.    Review of Systems:  Consitutional: no malaise, fatigue, fevers  HEENT: no headache, sore throat, rhinorrhea  Respiratory: no cough, SOB, chest pain  Cardiac: no palpitations, chest pain  Vascular: no leg swelling  Gastrointestinal: no nausea, vomiting, diarrhea or constipation  Genitourinary: no dysuria, nocturia or polyuria  MSK: no joint or muscle pain  Skin: no rashes  Neuro: no headaches, focal weakness or numbness  Psych: no depression or anxiety      OBJECTIVE:    Vital Signs Last 24 Hrs  T(C): 36.4 (30 Jul 2017 05:47), Max: 36.7 (29 Jul 2017 10:51)  T(F): 97.5 (30 Jul 2017 05:47), Max: 98 (29 Jul 2017 10:51)  HR: 83 (30 Jul 2017 05:47) (56 - 83)  BP: 148/88 (30 Jul 2017 05:47) (134/60 - 169/74)  BP(mean): --  RR: 15 (30 Jul 2017 05:47) (14 - 16)  SpO2: 96% (30 Jul 2017 05:47) (94% - 96%)    Height (cm): 170.18 (07-29 @ 13:06)  Weight (kg): 54.5 (07-29 @ 13:06)  BMI (kg/m2): 18.8 (07-29 @ 13:06)    CAPILLARY BLOOD GLUCOSE        Physical Exam:  General: No acute distress. Uncooperative. Agitated  Head: Normocephalic; atraumatic  Eyes: PERRL, EOM intact; conjunctiva and sclera clear. Limited eye opening upon exam.  ENMT: Moist mucus membranes, No nasal discharge; airway clear  Neck: Supple; non tender; no masses  Respiratory: No wheezes, rales or rhonchi  Cardiovascular: Regular rate and rhythm. S1 and S2 Normal; No murmurs, gallops or rubs  Gastrointestinal: Soft non-tender non-distended; Normal bowel sounds; No hepatosplenomegaly  Genitourinary: No costovertebral angle tenderness  Extremities: Normal range of motion, No clubbing, cyanosis or edema  MSK: PROM of all joints, no joint swelling.  Skin: Intact. No rash or lesion    MEDICATIONS  (STANDING):  multivitamin 1 Tablet(s) Oral daily  folic acid 1 milliGRAM(s) Oral daily  heparin  Injectable 5000 Unit(s) SubCutaneous every 8 hours  OLANZapine 5 milliGRAM(s) Oral at bedtime    MEDICATIONS  (PRN):  haloperidol    Injectable 1 milliGRAM(s) IntraMuscular every 4 hours PRN agitation      Allergies:  Allergies    No Known Allergies    Intolerances        Labs:                        13.6   5.3   )-----------( 210      ( 29 Jul 2017 07:41 )             42.7      07-29    142  |  103  |  19  ----------------------------<  91  3.6   |  29  |  0.70    Ca    9.4      29 Jul 2017 07:41    TPro  6.6  /  Alb  3.7  /  TBili  0.4  /  DBili  x   /  AST  25  /  ALT  19  /  AlkPhos  48  07-29          Microbiology:      Radiology: PGY-1 Progress Note    SUBJECTIVE:    Patient is a 89y old  Female who presents with a chief complaint of Agitation (29 Jul 2017 11:57)      Interval HPI:  Patient was agitated and confused this morning. She was saying it was nighttime and wanted to walk around. She was in no acute distress this morning. Psych has been consulted and will see her today. Pt saw and evaluated her and recommends RACHEL.    Review of Systems:  Consitutional: no malaise, fatigue, fevers  HEENT: no headache, sore throat, rhinorrhea  Respiratory: no cough, SOB, chest pain  Cardiac: no palpitations, chest pain  Vascular: no leg swelling  Gastrointestinal: no nausea, vomiting, diarrhea or constipation  Genitourinary: no dysuria, nocturia or polyuria  MSK: no joint or muscle pain  Skin: no rashes  Neuro: no headaches, focal weakness or numbness  Psych: no depression or anxiety      OBJECTIVE:    Vital Signs Last 24 Hrs  T(C): 36.4 (30 Jul 2017 05:47), Max: 36.7 (29 Jul 2017 10:51)  T(F): 97.5 (30 Jul 2017 05:47), Max: 98 (29 Jul 2017 10:51)  HR: 83 (30 Jul 2017 05:47) (56 - 83)  BP: 148/88 (30 Jul 2017 05:47) (134/60 - 169/74)  BP(mean): --  RR: 15 (30 Jul 2017 05:47) (14 - 16)  SpO2: 96% (30 Jul 2017 05:47) (94% - 96%)    Height (cm): 170.18 (07-29 @ 13:06)  Weight (kg): 54.5 (07-29 @ 13:06)  BMI (kg/m2): 18.8 (07-29 @ 13:06)    CAPILLARY BLOOD GLUCOSE        Physical Exam:  General: No acute distress. Uncooperative. Agitated  Head: Normocephalic; atraumatic  Eyes: PERRL, EOM intact; conjunctiva and sclera clear. Limited eye opening upon exam.  ENMT: Moist mucus membranes, No nasal discharge; airway clear  Neck: Supple; non tender; no masses  Respiratory: No wheezes, rales or rhonchi  Cardiovascular: Regular rate and rhythm. S1 and S2 Normal; No murmurs, gallops or rubs  Gastrointestinal: Soft non-tender non-distended; Normal bowel sounds; No hepatosplenomegaly  Genitourinary: No costovertebral angle tenderness  Extremities: Normal range of motion, No clubbing, cyanosis or edema  MSK: PROM of all joints, no joint swelling.  Skin: Intact. No rash or lesion    MEDICATIONS  (STANDING):  multivitamin 1 Tablet(s) Oral daily  folic acid 1 milliGRAM(s) Oral daily  heparin  Injectable 5000 Unit(s) SubCutaneous every 8 hours  OLANZapine 5 milliGRAM(s) Oral at bedtime    MEDICATIONS  (PRN):  haloperidol    Injectable 1 milliGRAM(s) IntraMuscular every 4 hours PRN agitation      Allergies:  Allergies    No Known Allergies    Intolerances        Labs:                        13.6   5.3   )-----------( 210      ( 29 Jul 2017 07:41 )             42.7      07-29    142  |  103  |  19  ----------------------------<  91  3.6   |  29  |  0.70    Ca    9.4      29 Jul 2017 07:41    TPro  6.6  /  Alb  3.7  /  TBili  0.4  /  DBili  x   /  AST  25  /  ALT  19  /  AlkPhos  48  07-29          Microbiology:      Radiology: PGY-1 Progress Note    SUBJECTIVE:    Patient is a 89y old  Female who presents with a chief complaint of Agitation (29 Jul 2017 11:57)      Interval HPI:  Patient was agitated and confused this morning. She was saying it was nighttime and wanted to walk around. She was in no acute distress this morning. Psych has been consulted and will see her today. Pt saw and evaluated her and recommends RACHEL.    Review of Systems:  Consitutional: no malaise, fatigue, fevers  HEENT: no headache, sore throat, rhinorrhea  Respiratory: no cough, SOB, chest pain  Cardiac: no palpitations, chest pain  Vascular: no leg swelling  Gastrointestinal: no nausea, vomiting, diarrhea or constipation  Genitourinary: no dysuria, nocturia or polyuria  MSK: no joint or muscle pain  Skin: no rashes  Neuro: no headaches, focal weakness or numbness  Psych: no depression or anxiety      OBJECTIVE:    Vital Signs Last 24 Hrs  T(C): 36.4 (30 Jul 2017 05:47), Max: 36.7 (29 Jul 2017 10:51)  T(F): 97.5 (30 Jul 2017 05:47), Max: 98 (29 Jul 2017 10:51)  HR: 83 (30 Jul 2017 05:47) (56 - 83)  BP: 148/88 (30 Jul 2017 05:47) (134/60 - 169/74)  BP(mean): --  RR: 15 (30 Jul 2017 05:47) (14 - 16)  SpO2: 96% (30 Jul 2017 05:47) (94% - 96%)    Height (cm): 170.18 (07-29 @ 13:06)  Weight (kg): 54.5 (07-29 @ 13:06)  BMI (kg/m2): 18.8 (07-29 @ 13:06)    CAPILLARY BLOOD GLUCOSE        Physical Exam:  General: No acute distress. Uncooperative. Agitated  Head: Normocephalic; atraumatic  Eyes: PERRL, EOM intact; conjunctiva and sclera clear. Limited eye opening upon exam.  ENMT: Moist mucus membranes, No nasal discharge; airway clear  Neck: Supple; non tender; no masses  Respiratory: No wheezes, rales or rhonchi  Cardiovascular: Regular rate and rhythm. S1 and S2 Normal; No murmurs, gallops or rubs  Gastrointestinal: Soft non-tender non-distended; Normal bowel sounds; No hepatosplenomegaly  Genitourinary: No costovertebral angle tenderness  Extremities: Normal range of motion, No clubbing, cyanosis or edema  MSK: PROM of joints, no joint swelling. Muscle weakness present b/l UE LE 4/5  Skin: Intact. No rash or lesion    MEDICATIONS  (STANDING):  multivitamin 1 Tablet(s) Oral daily  folic acid 1 milliGRAM(s) Oral daily  heparin  Injectable 5000 Unit(s) SubCutaneous every 8 hours  OLANZapine 5 milliGRAM(s) Oral at bedtime    MEDICATIONS  (PRN):  haloperidol    Injectable 1 milliGRAM(s) IntraMuscular every 4 hours PRN agitation      Allergies:  Allergies    No Known Allergies    Intolerances        Labs:                        13.6   5.3   )-----------( 210      ( 29 Jul 2017 07:41 )             42.7      07-29    142  |  103  |  19  ----------------------------<  91  3.6   |  29  |  0.70    Ca    9.4      29 Jul 2017 07:41    TPro  6.6  /  Alb  3.7  /  TBili  0.4  /  DBili  x   /  AST  25  /  ALT  19  /  AlkPhos  48  07-29          Microbiology:      Radiology:

## 2017-07-30 NOTE — BEHAVIORAL HEALTH ASSESSMENT NOTE - SUMMARY
89 yo woman, domiciled alone w/ 24hr HHA, w/ hx of GERD and Alzheimer's dementia w/ behavioral disturbance, no psych hosp/SA/SIB, bib EMS for agitated behavior. Pt's behavior is consistent with exacerbation of AD with behavioral disturbance. She also endorses possibly having a VH last night for the first time ever but wonder if it were an illusion in an new setting or delirium. Would continue to assess for VH. Pt also endorses depressed mood and sometimes feeling as if she doesn't want to live but has not plans to hurt herself. Her irritability is also likely an expressio of her depression. Would recommend a trial of zoloft for depression/irritability and SSRIs have been shown to have some efficacy in dementia w/ behavioral disturbance.    PLAN:  -Olanzapine 2.5 mg q12 hrs for behavioral disturbance  -Start Zoloft 12.5 mg qam for depressed mood/irritability  -For mild to moderate agitation: Olanzapine 2.5 mg PO q6hrs prn  -For severe agitation: Haldol 1 mg IM q6 hrs prn  -Daily EKG  -Primary team can continue 1:1 for agitation and dementia makes pt an elopement risk  -To reduce risk of delirium frequently orient pt, keep lights and noise low at night, reduce frequent awakenings  -C/L to follow
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
## 2017-07-30 NOTE — PHYSICAL THERAPY INITIAL EVALUATION ADULT - IMPAIRMENTS CONTRIBUTING TO GAIT DEVIATIONS, PT EVAL
narrow base of support/impaired motor control/impaired coordination/impaired postural control/impaired balance/cognition/decreased strength

## 2017-07-30 NOTE — PROGRESS NOTE ADULT - PROBLEM SELECTOR PLAN 2
-Advanced dementia from Alzheimers  -Supportive care  -Awaiting placement to long term care facility for pts with dementia  -Awaiting Neuro and Psych evaluation

## 2017-07-30 NOTE — PHYSICAL THERAPY INITIAL EVALUATION ADULT - MODALITIES TREATMENT COMMENTS
Left upper quadrant impaired for quadrant exam, patient not able to fully follow directives for visual fields assessment

## 2017-07-30 NOTE — BEHAVIORAL HEALTH ASSESSMENT NOTE - OTHER
tall frail  woman lying in bed Frail No rigitity or tremor in b/l UE and LE. +Glabellar reflex, no palmar grasp or routing lying in bed, unable to assess "I don't feel well"
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
## 2017-07-31 PROCEDURE — 99222 1ST HOSP IP/OBS MODERATE 55: CPT

## 2017-07-31 PROCEDURE — 93010 ELECTROCARDIOGRAM REPORT: CPT

## 2017-07-31 RX ADMIN — HEPARIN SODIUM 5000 UNIT(S): 5000 INJECTION INTRAVENOUS; SUBCUTANEOUS at 06:37

## 2017-07-31 RX ADMIN — Medication 1 TABLET(S): at 11:48

## 2017-07-31 RX ADMIN — OLANZAPINE 2.5 MILLIGRAM(S): 15 TABLET, FILM COATED ORAL at 10:04

## 2017-07-31 RX ADMIN — HEPARIN SODIUM 5000 UNIT(S): 5000 INJECTION INTRAVENOUS; SUBCUTANEOUS at 22:40

## 2017-07-31 RX ADMIN — Medication 1 MILLIGRAM(S): at 11:48

## 2017-07-31 RX ADMIN — HALOPERIDOL DECANOATE 1 MILLIGRAM(S): 100 INJECTION INTRAMUSCULAR at 15:30

## 2017-07-31 NOTE — PROGRESS NOTE ADULT - PROBLEM SELECTOR PLAN 2
-Advanced dementia from Alzheimers  -Supportive care  -Awaiting placement to long term care facility for pts with dementia

## 2017-07-31 NOTE — CONSULT NOTE ADULT - ASSESSMENT
89yF with advanced dementia, GERD presented with increasing agitation being admitted for worsening dementia and placement to a long term care facility for dementia.    Problem/Plan - 1:  ·  Problem: Dementia, senile with delusions.  Plan: -Ruled out for stroke or ruled out for stroke or toxic metabolic encephalopathy.  - EKG with normal QT  - Can give Haldol 1mg PRN for combative behavior  -Awaiting Neuro and Psych evaluation  - Continue Zyprexa 2.5mg PO at bedtime  - Awaiting long term care placement for patients with dementia  - Will limit blood draws   - EKG daily.     Problem/Plan - 2:  ·  Problem: Alzheimers disease.  Plan: -Advanced dementia from Alzheimers  -Supportive care  -Awaiting placement to long term care facility for pts with dementia  -Awaiting Neuro and Psych evaluation.     Problem/Plan - 3:  ·  Problem: Aggression.  Plan: -Can give Haldol 1mg PRN if pt becomes to aggressive.

## 2017-07-31 NOTE — PROGRESS NOTE ADULT - SUBJECTIVE AND OBJECTIVE BOX
PGY-1 Progress Note    SUBJECTIVE:    Patient is a 89y old  Female who presents with a chief complaint of Agitation (29 Jul 2017 11:57)      Interval HPI:  Patient remained agitated and confused today. She rested overnight with no aggressive episodes like the night prior. Still awaiting placement for long term dementia care facility.    Review of Systems:  Consitutional: no malaise, fatigue, fevers  HEENT: no headache, sore throat, rhinorrhea  Respiratory: no cough, SOB, chest pain  Cardiac: no palpitations, chest pain  Vascular: no leg swelling  Gastrointestinal: no nausea, vomiting, diarrhea or constipation  Genitourinary: no dysuria, nocturia or polyuria  MSK: no joint or muscle pain  Skin: no rashes  Neuro: no headaches, focal weakness or numbness  Psych: no depression or anxiety      OBJECTIVE:    Vital Signs Last 24 Hrs  T(C): 36.6 (31 Jul 2017 09:34), Max: 36.6 (31 Jul 2017 09:34)  T(F): 97.8 (31 Jul 2017 09:34), Max: 97.8 (31 Jul 2017 09:34)  HR: 74 (31 Jul 2017 09:34) (66 - 75)  BP: 109/74 (31 Jul 2017 09:34) (101/61 - 113/64)  BP(mean): --  RR: 16 (31 Jul 2017 09:34) (14 - 16)  SpO2: 93% (31 Jul 2017 09:34) (93% - 98%)    Height (cm): 170.18 (07-29 @ 13:06)  Weight (kg): 54.5 (07-29 @ 13:06)  BMI (kg/m2): 18.8 (07-29 @ 13:06)    CAPILLARY BLOOD GLUCOSE          Physical Exam:  General: No acute distress  Head: Normocephalic; atraumatic  Eyes: PERRL, EOM intact; conjunctiva and sclera clear.   ENMT: Moist mucus membranes, No nasal discharge; airway clear  Neck: Supple; non tender; no masses  Respiratory: No wheezes, rales or rhonchi  Cardiovascular: Regular rate and rhythm. S1 and S2 Normal; No murmurs, gallops or rubs  Gastrointestinal: Soft non-tender non-distended; Normal bowel sounds; No hepatosplenomegaly  Genitourinary: No costovertebral angle tenderness  Extremities: Normal range of motion, No clubbing, cyanosis or edema  MSK: PROM of joints, no joint swelling. Muscle weakness present b/l UE LE 4/5  Skin: No rash or lesions      MEDICATIONS  (STANDING):  multivitamin 1 Tablet(s) Oral daily  folic acid 1 milliGRAM(s) Oral daily  heparin  Injectable 5000 Unit(s) SubCutaneous every 8 hours  OLANZapine 2.5 milliGRAM(s) Oral with breakfast  OLANZapine 2.5 milliGRAM(s) Oral at bedtime    MEDICATIONS  (PRN):  haloperidol    Injectable 1 milliGRAM(s) IntraMuscular every 4 hours PRN agitation      Allergies:  Allergies    No Known Allergies    Intolerances        Labs:                 Microbiology:      Radiology: PGY-1 Progress Note    SUBJECTIVE:    Patient is a 89y old  Female who presents with a chief complaint of Agitation (29 Jul 2017 11:57)      Interval HPI:  Patient remained agitated and confused today. She rested overnight with no aggressive episodes overnight. Still awaiting placement for long term dementia care facility. She became aggressive and combative and tried to leave her room during the afternoon today and required 1mg Haldol IM    Review of Systems:  Consitutional: no malaise, fatigue, fevers  HEENT: no headache, sore throat, rhinorrhea  Respiratory: no cough, SOB, chest pain  Cardiac: no palpitations, chest pain  Vascular: no leg swelling  Gastrointestinal: no nausea, vomiting, diarrhea or constipation  Genitourinary: no dysuria, nocturia or polyuria  MSK: no joint or muscle pain  Skin: no rashes  Neuro: no headaches, focal weakness or numbness  Psych: no depression or anxiety      OBJECTIVE:    Vital Signs Last 24 Hrs  T(C): 36.6 (31 Jul 2017 09:34), Max: 36.6 (31 Jul 2017 09:34)  T(F): 97.8 (31 Jul 2017 09:34), Max: 97.8 (31 Jul 2017 09:34)  HR: 74 (31 Jul 2017 09:34) (66 - 75)  BP: 109/74 (31 Jul 2017 09:34) (101/61 - 113/64)  BP(mean): --  RR: 16 (31 Jul 2017 09:34) (14 - 16)  SpO2: 93% (31 Jul 2017 09:34) (93% - 98%)    Height (cm): 170.18 (07-29 @ 13:06)  Weight (kg): 54.5 (07-29 @ 13:06)  BMI (kg/m2): 18.8 (07-29 @ 13:06)    CAPILLARY BLOOD GLUCOSE          Physical Exam:  General: No acute distress  Head: Normocephalic; atraumatic  Eyes: PERRL, EOM intact; conjunctiva and sclera clear.   ENMT: Moist mucus membranes, No nasal discharge; airway clear  Neck: Supple; non tender; no masses  Respiratory: No wheezes, rales or rhonchi  Cardiovascular: Regular rate and rhythm. S1 and S2 Normal; No murmurs, gallops or rubs  Gastrointestinal: Soft non-tender non-distended; Normal bowel sounds; No hepatosplenomegaly  Genitourinary: No costovertebral angle tenderness  Extremities: Normal range of motion, No clubbing, cyanosis or edema  MSK: PROM of joints, no joint swelling. Muscle weakness present b/l UE LE 4/5  Skin: No rash or lesions      MEDICATIONS  (STANDING):  multivitamin 1 Tablet(s) Oral daily  folic acid 1 milliGRAM(s) Oral daily  heparin  Injectable 5000 Unit(s) SubCutaneous every 8 hours  OLANZapine 2.5 milliGRAM(s) Oral with breakfast  OLANZapine 2.5 milliGRAM(s) Oral at bedtime    MEDICATIONS  (PRN):  haloperidol    Injectable 1 milliGRAM(s) IntraMuscular every 4 hours PRN agitation      Allergies:  Allergies    No Known Allergies    Intolerances        Labs:                 Microbiology:      Radiology:

## 2017-07-31 NOTE — PROGRESS NOTE ADULT - SUBJECTIVE AND OBJECTIVE BOX
I examined the patient today, reviewed the lab data, xrays and additional studies. Additionally I have reviewed the notes and assessments by the residents and consultants.   At this point I agree with the assessments and plan.  I would also advise close observation, and supportive care .Psych and neuro followup

## 2017-07-31 NOTE — PROGRESS NOTE ADULT - ATTENDING COMMENTS
Recs:  -Cont. curr. meds and supportive tx  -Daily EKGs to monitor for QTc prolongation if on SGA  -Cont. f/u w/ Dr. Miller for dementia and superimposed delirium

## 2017-07-31 NOTE — PROGRESS NOTE ADULT - PROBLEM SELECTOR PLAN 1
-Ruled out for stroke or ruled out for stroke or toxic metabolic encephalopathy.  - EKG with normal QT  -Per psych recs, Zyprexa 2.5mg PO in the morning and  Zyprexa 2.5mg PO at bedtime  - Awaiting long term care placement for patients with dementia  - Will limit blood draws   - EKG daily.   -Can give Haldol 1mg PRN if pt becomes to aggressive.

## 2017-07-31 NOTE — PROGRESS NOTE ADULT - SUBJECTIVE AND OBJECTIVE BOX
Pt seen and chart reviewed.  d/w staff. Private aide at bedside. Pt awake and alert, responsive. Pt confused and w/ difficulty answering orientation questions. She also is suspicious/paranoid. Notes having poor memory. Aide reports her memory has worsened and confusion increased over the past week. Pt concerned about reason this MD is seeing her and reports numerous vague complaints. Not endorsing SI today. Reassurance and encouragement provided.    From Behavioral Health Assessment:  ""  89 yo woman, domiciled alone w/ 24hr HHA, w/ hx of GERD and Alzheimer's dementia w/ behavioral disturbance, no psych hosp/SA/SIB, bib EMS for agitated behavior. Pt's behavior is consistent with exacerbation of AD with behavioral disturbance. She also endorses possibly having a VH last night for the first time ever but wonder if it were an illusion in an new setting or delirium. Would continue to assess for VH. Pt also endorses depressed mood and sometimes feeling as if she doesn't want to live but has not plans to hurt herself. Her irritability is also likely an expressio of her depression. Would recommend a trial of zoloft for depression/irritability and SSRIs have been shown to have some efficacy in dementia w/ behavioral disturbance.  ""

## 2017-08-01 PROCEDURE — 99232 SBSQ HOSP IP/OBS MODERATE 35: CPT

## 2017-08-01 PROCEDURE — 93010 ELECTROCARDIOGRAM REPORT: CPT

## 2017-08-01 RX ADMIN — HEPARIN SODIUM 5000 UNIT(S): 5000 INJECTION INTRAVENOUS; SUBCUTANEOUS at 21:53

## 2017-08-01 RX ADMIN — Medication 1 MILLIGRAM(S): at 13:20

## 2017-08-01 RX ADMIN — OLANZAPINE 2.5 MILLIGRAM(S): 15 TABLET, FILM COATED ORAL at 10:17

## 2017-08-01 RX ADMIN — OLANZAPINE 2.5 MILLIGRAM(S): 15 TABLET, FILM COATED ORAL at 00:35

## 2017-08-01 RX ADMIN — OLANZAPINE 2.5 MILLIGRAM(S): 15 TABLET, FILM COATED ORAL at 22:04

## 2017-08-01 RX ADMIN — HEPARIN SODIUM 5000 UNIT(S): 5000 INJECTION INTRAVENOUS; SUBCUTANEOUS at 15:29

## 2017-08-01 RX ADMIN — Medication 1 TABLET(S): at 13:20

## 2017-08-01 RX ADMIN — HEPARIN SODIUM 5000 UNIT(S): 5000 INJECTION INTRAVENOUS; SUBCUTANEOUS at 05:42

## 2017-08-01 NOTE — CHART NOTE - NSCHARTNOTEFT_GEN_A_CORE
Upon Nutritional Assessment by the Registered Dietitian your patient was determined to meet criteria / has evidence of the following diagnosis/diagnoses:          [X]  Mild Protein Calorie Malnutrition        [ ]  Moderate Protein Calorie Malnutrition        [ ] Severe Protein Calorie Malnutrition        [ ] Unspecified Protein Calorie Malnutrition        [X] Underweight / BMI <19        [ ] Morbid Obesity / BMI > 40      Findings as based on:  •  Comprehensive nutrition assessment and consultation  •  Calorie counts (nutrient intake analysis)  •  Food acceptance and intake status from observations by staff  •  Follow up  •  Patient education  •  Intervention secondary to interdisciplinary rounds  •   concerns      Treatment:    The following diet has been recommended:  Ensure enlive TID  Assist with meals prn  MVI    PROVIDER Section:     By signing this assessment you are acknowledging and agree with the diagnosis/diagnoses assigned by the Registered Dietitian    Comments:
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
## 2017-08-01 NOTE — PROGRESS NOTE ADULT - SUBJECTIVE AND OBJECTIVE BOX
PGY-2 Progress Note    SUBJECTIVE:    Patient is a 89y old  Female who presents with a chief complaint of Agitation (29 Jul 2017 11:57)      Interval HPI:      Review of Systems:  Consitutional: no malaise, fatigue, fevers  HEENT: no headache, sore throat, rhinorrhea  Respiratory: no cough, SOB, chest pain  Cardiac: no palpitations, chest pain  Vascular: no leg swelling  Gastrointestinal: no nausea, constipation, or diarrhea  Genitourinary: no dysuria, nocturia or polyuria  MSK: no joint or muscle pain  Skin: no rashes  Neuro: no headaches, focal weakness or numbness  Psych: no depression or anxiety      OBJECTIVE:    Vital Signs Last 24 Hrs  T(C): 36.6 (01 Aug 2017 05:19), Max: 36.6 (31 Jul 2017 16:49)  T(F): 97.9 (01 Aug 2017 05:19), Max: 97.9 (31 Jul 2017 16:49)  HR: 86 (01 Aug 2017 05:19) (86 - 98)  BP: 145/80 (01 Aug 2017 05:19) (97/64 - 145/80)  BP(mean): --  RR: 15 (01 Aug 2017 05:19) (15 - 16)  SpO2: 97% (01 Aug 2017 05:19) (94% - 97%)    Height (cm): 170.18 (07-29 @ 13:06)  Weight (kg): 54.5 (07-29 @ 13:06)  BMI (kg/m2): 18.8 (07-29 @ 13:06)    CAPILLARY BLOOD GLUCOSE        Physical Exam:  General: No acute distress  Head: Normocephalic; atraumatic  Eyes: PERRL, EOM intact; conjunctiva and sclera clear.   ENMT: Moist mucus membranes, No nasal discharge; airway clear  Neck: Supple; non tender; no masses  Respiratory: No wheezes, rales or rhonchi  Cardiovascular: Regular rate and rhythm. S1 and S2 Normal; No murmurs, gallops or rubs  Gastrointestinal: Soft non-tender non-distended; Normal bowel sounds; No hepatosplenomegaly  Genitourinary: No costovertebral angle tenderness  Extremities: Normal range of motion, No clubbing, cyanosis or edema  MSK: PROM of joints, no joint swelling. Muscle weakness present b/l UE LE 4/5  Skin: No rash or lesions      MEDICATIONS  (STANDING):  multivitamin 1 Tablet(s) Oral daily  folic acid 1 milliGRAM(s) Oral daily  heparin  Injectable 5000 Unit(s) SubCutaneous every 8 hours  OLANZapine 2.5 milliGRAM(s) Oral with breakfast  OLANZapine 2.5 milliGRAM(s) Oral at bedtime    MEDICATIONS  (PRN):  haloperidol    Injectable 1 milliGRAM(s) IntraMuscular every 4 hours PRN agitation      Allergies:  Allergies    No Known Allergies    Intolerances        Labs:                 Microbiology:      Radiology: PGY-2 Progress Note    SUBJECTIVE:    Patient is a 89y old  Female who presents with a chief complaint of Agitation (29 Jul 2017 11:57)      Interval HPI:  Patient had no episodes of aggression or agitated behavior overnight or during the day. Awaiting placement in long term care facility.    Review of Systems:  Consitutional: no malaise, fatigue, fevers  HEENT: no headache, sore throat, rhinorrhea  Respiratory: no cough, SOB, chest pain  Cardiac: no palpitations, chest pain  Vascular: no leg swelling  Gastrointestinal: no nausea, constipation, or diarrhea  Genitourinary: no dysuria, nocturia or polyuria  MSK: no joint or muscle pain  Skin: no rashes  Neuro: no headaches, focal weakness or numbness  Psych: no depression or anxiety      OBJECTIVE:    Vital Signs Last 24 Hrs  T(C): 36.6 (01 Aug 2017 05:19), Max: 36.6 (31 Jul 2017 16:49)  T(F): 97.9 (01 Aug 2017 05:19), Max: 97.9 (31 Jul 2017 16:49)  HR: 86 (01 Aug 2017 05:19) (86 - 98)  BP: 145/80 (01 Aug 2017 05:19) (97/64 - 145/80)  BP(mean): --  RR: 15 (01 Aug 2017 05:19) (15 - 16)  SpO2: 97% (01 Aug 2017 05:19) (94% - 97%)    Height (cm): 170.18 (07-29 @ 13:06)  Weight (kg): 54.5 (07-29 @ 13:06)  BMI (kg/m2): 18.8 (07-29 @ 13:06)    CAPILLARY BLOOD GLUCOSE        Physical Exam:  General: No acute distress  Head: Normocephalic; atraumatic  Eyes: PERRL, EOM intact; conjunctiva and sclera clear.   ENMT: Moist mucus membranes, No nasal discharge; airway clear  Neck: Supple; non tender; no masses  Respiratory: No wheezes, rales or rhonchi  Cardiovascular: Regular rate and rhythm. S1 and S2 Normal; No murmurs, gallops or rubs  Gastrointestinal: Soft non-tender non-distended; Normal bowel sounds; No hepatosplenomegaly  Genitourinary: No costovertebral angle tenderness  Extremities: Normal range of motion, No clubbing, cyanosis or edema  MSK: PROM of joints, no joint swelling. Muscle weakness present b/l UE LE 4/5  Skin: No rash or lesions      MEDICATIONS  (STANDING):  multivitamin 1 Tablet(s) Oral daily  folic acid 1 milliGRAM(s) Oral daily  heparin  Injectable 5000 Unit(s) SubCutaneous every 8 hours  OLANZapine 2.5 milliGRAM(s) Oral with breakfast  OLANZapine 2.5 milliGRAM(s) Oral at bedtime    MEDICATIONS  (PRN):  haloperidol    Injectable 1 milliGRAM(s) IntraMuscular every 4 hours PRN agitation      Allergies:  Allergies    No Known Allergies    Intolerances        Labs:                 Microbiology:      Radiology:

## 2017-08-01 NOTE — PROGRESS NOTE ADULT - PROBLEM SELECTOR PLAN 1
-Ruled out for stroke or ruled out for stroke or toxic metabolic encephalopathy.  -Patient has been intermittently aggressive and combative  - EKG with normal QT  -Per psych recs, Zyprexa 2.5mg PO in the morning and  Zyprexa 2.5mg PO at bedtime  - Awaiting long term care placement for patients with dementia  - Will limit blood draws   - EKG daily.   -Can give Haldol 1mg PRN if pt becomes to aggressive.

## 2017-08-01 NOTE — DIETITIAN INITIAL EVALUATION ADULT. - ENERGY NEEDS
IBW 61.4Kg  %IBW 89%  BMI 18.8    Utilized ABW to calculate needs, pt falls within % of IBW. Adjusted for repletion/age.

## 2017-08-01 NOTE — PROGRESS NOTE ADULT - PROBLEM SELECTOR PLAN 2
-Advanced dementia from Alzheimers  -Supportive care  -Awaiting placement to long term care facility for pts with dementia.

## 2017-08-01 NOTE — DIETITIAN INITIAL EVALUATION ADULT. - OTHER INFO
89y/o F admitted with agitation and awaiting placement for a long term care facility 2/2 dementia. Pt see OOB in chair with HHA at bedside. Pt reports a good appetite and intake and is consuming % of meals. Denies N/V/D/C, pain, and mechanical issues with po intake. Pt reports wt loss, but is stable at reported #. HHA denies noticeably wt loss and reports intake is good PTA. Pt consumes 3x meals/day and drinks >/=3 ensure supplements a day. But, HHA endorses that pt is forgetful and will skip meals at times or eat double portions. Suspect mild PCM due to muscle wasting and intake fluctuations 2/2 mental status; see chart note. Will follow and monitor meal intake and tolerance.

## 2017-08-01 NOTE — DIETITIAN INITIAL EVALUATION ADULT. - PHYSICAL APPEARANCE
underweight/thin, with slight muscle wasting to temporal, buccal, and shoulder regions/other (specify)

## 2017-08-02 RX ORDER — QUETIAPINE FUMARATE 200 MG/1
25 TABLET, FILM COATED ORAL AT BEDTIME
Qty: 0 | Refills: 0 | Status: DISCONTINUED | OUTPATIENT
Start: 2017-08-02 | End: 2017-08-03

## 2017-08-02 RX ORDER — ALPRAZOLAM 0.25 MG
0.25 TABLET ORAL AT BEDTIME
Qty: 0 | Refills: 0 | Status: DISCONTINUED | OUTPATIENT
Start: 2017-08-02 | End: 2017-08-04

## 2017-08-02 RX ORDER — DONEPEZIL HYDROCHLORIDE 10 MG/1
10 TABLET, FILM COATED ORAL AT BEDTIME
Qty: 0 | Refills: 0 | Status: DISCONTINUED | OUTPATIENT
Start: 2017-08-02 | End: 2017-08-04

## 2017-08-02 RX ORDER — SERTRALINE 25 MG/1
25 TABLET, FILM COATED ORAL DAILY
Qty: 0 | Refills: 0 | Status: DISCONTINUED | OUTPATIENT
Start: 2017-08-02 | End: 2017-08-04

## 2017-08-02 RX ADMIN — HALOPERIDOL DECANOATE 1 MILLIGRAM(S): 100 INJECTION INTRAMUSCULAR at 11:37

## 2017-08-02 RX ADMIN — Medication 1 MILLIGRAM(S): at 11:37

## 2017-08-02 RX ADMIN — DONEPEZIL HYDROCHLORIDE 10 MILLIGRAM(S): 10 TABLET, FILM COATED ORAL at 22:12

## 2017-08-02 RX ADMIN — Medication 1 TABLET(S): at 11:37

## 2017-08-02 RX ADMIN — OLANZAPINE 2.5 MILLIGRAM(S): 15 TABLET, FILM COATED ORAL at 22:12

## 2017-08-02 RX ADMIN — OLANZAPINE 2.5 MILLIGRAM(S): 15 TABLET, FILM COATED ORAL at 10:10

## 2017-08-02 RX ADMIN — Medication 0.25 MILLIGRAM(S): at 22:12

## 2017-08-02 RX ADMIN — QUETIAPINE FUMARATE 25 MILLIGRAM(S): 200 TABLET, FILM COATED ORAL at 22:12

## 2017-08-02 RX ADMIN — SERTRALINE 25 MILLIGRAM(S): 25 TABLET, FILM COATED ORAL at 22:13

## 2017-08-02 RX ADMIN — HALOPERIDOL DECANOATE 1 MILLIGRAM(S): 100 INJECTION INTRAMUSCULAR at 03:55

## 2017-08-02 RX ADMIN — HEPARIN SODIUM 5000 UNIT(S): 5000 INJECTION INTRAVENOUS; SUBCUTANEOUS at 22:12

## 2017-08-02 NOTE — PROGRESS NOTE ADULT - PROBLEM SELECTOR PLAN 2
-Advanced dementia from Alzheimers  -Supportive care  -Awaiting placement to long term care facility for pts with dementia. -Advanced dementia from Alzheimers  -Supportive care  -Awaiting placement to long term care facility for pts with dementia.  -Per Dr. Perez, patient normally takes Zoloft 25mg, Donepezil 10mg, Xanax 0.25mg. Will continue meds today

## 2017-08-02 NOTE — PROGRESS NOTE ADULT - SUBJECTIVE AND OBJECTIVE BOX
PGY-2 Progress Note    SUBJECTIVE:    Patient is a 89y old  Female who presents with a chief complaint of Agitation (29 Jul 2017 11:57)      Interval HPI:  Patient had one episode of agitation and aggressiveness overnight that required Haldol and restraint. The episode resolved and she was non agitated or aggressive this AM. Still awaiting placement in long term care facility.    Review of Systems:  Consitutional: no malaise, fatigue, fevers  HEENT: no headache, sore throat, rhinorrhea  Respiratory: no cough, SOB, chest pain  Cardiac: no palpitations, chest pain  Vascular: no leg swelling  Gastrointestinal: no nausea, vomiting, diarrhea or constipation  Genitourinary: no dysuria, nocturia or polyuria  MSK: no joint or muscle pain  Skin: no rashes  Neuro: no headaches, focal weakness or numbness  Psych: no depression or anxiety      OBJECTIVE:    Vital Signs Last 24 Hrs  T(C): 36.1 (02 Aug 2017 05:21), Max: 36.7 (01 Aug 2017 13:25)  T(F): 97 (02 Aug 2017 05:21), Max: 98.1 (01 Aug 2017 13:25)  HR: 84 (02 Aug 2017 05:21) (79 - 84)  BP: 146/72 (02 Aug 2017 05:21) (118/78 - 146/72)  BP(mean): --  RR: 16 (02 Aug 2017 05:21) (16 - 16)  SpO2: 94% (02 Aug 2017 05:21) (94% - 96%)    Height (cm): 170.18 (07-29 @ 13:06)  Weight (kg): 54.5 (07-29 @ 13:06)  BMI (kg/m2): 18.8 (07-29 @ 13:06)    CAPILLARY BLOOD GLUCOSE          Physical Exam:  General: No acute distress  Head: Normocephalic; atraumatic  Eyes: PERRL, EOM intact; conjunctiva and sclera clear.   ENMT: Moist mucus membranes, No nasal discharge; airway clear  Neck: Supple; non tender; no masses  Respiratory: No wheezes, rales or rhonchi  Cardiovascular: Regular rate and rhythm. S1 and S2 Normal; No murmurs, gallops or rubs  Gastrointestinal: Soft non-tender non-distended; Normal bowel sounds; No hepatosplenomegaly  Genitourinary: No costovertebral angle tenderness  Extremities: Normal range of motion, No clubbing, cyanosis or edema  MSK: PROM of joints, no joint swelling. Muscle weakness present b/l UE LE 4/5  Skin: No rash or lesions      MEDICATIONS  (STANDING):  multivitamin 1 Tablet(s) Oral daily  folic acid 1 milliGRAM(s) Oral daily  heparin  Injectable 5000 Unit(s) SubCutaneous every 8 hours  OLANZapine 2.5 milliGRAM(s) Oral with breakfast  OLANZapine 2.5 milliGRAM(s) Oral at bedtime    MEDICATIONS  (PRN):  haloperidol    Injectable 1 milliGRAM(s) IntraMuscular every 4 hours PRN agitation      Allergies:  Allergies    No Known Allergies    Intolerances        Labs:                 Microbiology:      Radiology: PGY-1 Progress Note    SUBJECTIVE:    Patient is a 89y old  Female who presents with a chief complaint of Agitation (29 Jul 2017 11:57)      Interval HPI:  Patient had one episode of agitation and aggressiveness overnight that required Haldol and restraint. The episode resolved and she was non agitated or aggressive this AM. She had an episode of aggression mid-day that resolved without the need for Haldol. Still awaiting placement in long term care facility.    Review of Systems:  Consitutional: no malaise, fatigue, fevers  HEENT: no headache, sore throat, rhinorrhea  Respiratory: no cough, SOB, chest pain  Cardiac: no palpitations, chest pain  Vascular: no leg swelling  Gastrointestinal: no nausea, vomiting, diarrhea or constipation  Genitourinary: no dysuria, nocturia or polyuria  MSK: no joint or muscle pain  Skin: no rashes  Neuro: no headaches, focal weakness or numbness  Psych: no depression or anxiety      OBJECTIVE:    Vital Signs Last 24 Hrs  T(C): 36.1 (02 Aug 2017 05:21), Max: 36.7 (01 Aug 2017 13:25)  T(F): 97 (02 Aug 2017 05:21), Max: 98.1 (01 Aug 2017 13:25)  HR: 84 (02 Aug 2017 05:21) (79 - 84)  BP: 146/72 (02 Aug 2017 05:21) (118/78 - 146/72)  BP(mean): --  RR: 16 (02 Aug 2017 05:21) (16 - 16)  SpO2: 94% (02 Aug 2017 05:21) (94% - 96%)    Height (cm): 170.18 (07-29 @ 13:06)  Weight (kg): 54.5 (07-29 @ 13:06)  BMI (kg/m2): 18.8 (07-29 @ 13:06)    CAPILLARY BLOOD GLUCOSE          Physical Exam:  General: No acute distress  Head: Normocephalic; atraumatic  Eyes: PERRL, EOM intact; conjunctiva and sclera clear.   ENMT: Moist mucus membranes, No nasal discharge; airway clear  Neck: Supple; non tender; no masses  Respiratory: No wheezes, rales or rhonchi  Cardiovascular: Regular rate and rhythm. S1 and S2 Normal; No murmurs, gallops or rubs  Gastrointestinal: Soft non-tender non-distended; Normal bowel sounds; No hepatosplenomegaly  Genitourinary: No costovertebral angle tenderness  Extremities: Normal range of motion, No clubbing, cyanosis or edema  MSK: PROM of joints, no joint swelling. Muscle weakness present b/l UE LE 4/5  Skin: No rash or lesions      MEDICATIONS  (STANDING):  multivitamin 1 Tablet(s) Oral daily  folic acid 1 milliGRAM(s) Oral daily  heparin  Injectable 5000 Unit(s) SubCutaneous every 8 hours  OLANZapine 2.5 milliGRAM(s) Oral with breakfast  OLANZapine 2.5 milliGRAM(s) Oral at bedtime    MEDICATIONS  (PRN):  haloperidol    Injectable 1 milliGRAM(s) IntraMuscular every 4 hours PRN agitation      Allergies:  Allergies    No Known Allergies    Intolerances        Labs:                 Microbiology:      Radiology:

## 2017-08-03 RX ORDER — OLANZAPINE 15 MG/1
5 TABLET, FILM COATED ORAL AT BEDTIME
Qty: 0 | Refills: 0 | Status: DISCONTINUED | OUTPATIENT
Start: 2017-08-03 | End: 2017-08-04

## 2017-08-03 RX ADMIN — OLANZAPINE 5 MILLIGRAM(S): 15 TABLET, FILM COATED ORAL at 21:56

## 2017-08-03 RX ADMIN — OLANZAPINE 2.5 MILLIGRAM(S): 15 TABLET, FILM COATED ORAL at 10:53

## 2017-08-03 RX ADMIN — HALOPERIDOL DECANOATE 1 MILLIGRAM(S): 100 INJECTION INTRAMUSCULAR at 17:09

## 2017-08-03 RX ADMIN — DONEPEZIL HYDROCHLORIDE 10 MILLIGRAM(S): 10 TABLET, FILM COATED ORAL at 21:57

## 2017-08-03 RX ADMIN — HEPARIN SODIUM 5000 UNIT(S): 5000 INJECTION INTRAVENOUS; SUBCUTANEOUS at 21:57

## 2017-08-03 RX ADMIN — Medication 0.25 MILLIGRAM(S): at 21:57

## 2017-08-03 RX ADMIN — SERTRALINE 25 MILLIGRAM(S): 25 TABLET, FILM COATED ORAL at 12:51

## 2017-08-03 RX ADMIN — Medication 1 TABLET(S): at 12:51

## 2017-08-03 RX ADMIN — Medication 1 MILLIGRAM(S): at 12:51

## 2017-08-03 NOTE — CONSULT NOTE ADULT - SUBJECTIVE AND OBJECTIVE BOX
REASON FOR CONSULT:    HISTORY OF PRESENT ILLNESS: 89yo woman with advanced dementia, GERD brought from home with increased agitation. Pt is homebound for dementia, has 24 hour home health aide. She has been hostile and physically abusive to her aides for the last couple of days. Overnight, she was more aggressive so her aide called 911. She has been at her usual state of health, no change in mental status, has good appetite, voids spontaneously. No fever/chills, headache, cough, SOB, chest pain, abdominal pain, dysuria, leg swelling, rash.     ED course: VSS, except for elevated BP for agitation. Her CT head was negative for stroke. Her CBC, CMP are unremarkable. She required Ativan 2mg x 1.     As per Annamarie Barlow ([phone]) who is patient's niece and HCP, pt is DNR/DNI. ED case manager contacted her home agency service who stated that agency is unable to manage her at home anymore. [place] is preferred location for placement.      PAST MEDICAL & SURGICAL HISTORY:  Alzheimers disease      [ ] Diabetes   [ ] Hypertension  [ ] Hyperlipidemia  [ ] CAD  [ ] PCI  [ ] CABG    PREVIOUS DIAGNOSTIC TESTING:    [ ] Echocardiogram:  [ ]  Catheterization:  [ ] Stress Test:  	    MEDICATIONS:        haloperidol    Injectable 1 milliGRAM(s) IntraMuscular every 4 hours PRN  OLANZapine 2.5 milliGRAM(s) Oral with breakfast  OLANZapine 2.5 milliGRAM(s) Oral at bedtime        multivitamin 1 Tablet(s) Oral daily  folic acid 1 milliGRAM(s) Oral daily  heparin  Injectable 5000 Unit(s) SubCutaneous every 8 hours      FAMILY HISTORY:  No pertinent family history in first degree relatives      SOCIAL HISTORY:    [ ] Non-smoker  [ ] Smoker  [ ] Alcohol    Allergies    No Known Allergies    Intolerances    	    REVIEW OF SYSTEMS:    [x] as per HPI  CONSTITUTIONAL: No fever, weight loss, or fatigue  ENT:  No difficulty hearing, tinnitus, vertigo; No sinus or throat pain  RESPIRATORY: No cough, wheezing, chills or hemoptysis; No Shortness of Breath  CARDIOVASCULAR: No chest pain, palpitations, dizziness, or leg swelling  GASTROINTESTINAL: No abdominal or epigastric pain. No nausea, vomiting, or hematemesis; No diarrhea or constipation. No melena or hematochezia.  GENITOURINARY: No dysuria, frequency, hematuria, or incontinence  NEUROLOGICAL: No headaches, memory loss, loss of strength, numbness, or tremors  MUSCULOSKELETAL: No joint pain or swelling; No muscle, back, or extremity pain  [x] All others negative	  [ ] Unable to obtain    PHYSICAL EXAM:  T(C): 36.6 (07-31-17 @ 09:34), Max: 36.6 (07-31-17 @ 09:34)  HR: 74 (07-31-17 @ 09:34) (66 - 75)  BP: 109/74 (07-31-17 @ 09:34) (101/61 - 113/64)  RR: 16 (07-31-17 @ 09:34) (14 - 16)  SpO2: 93% (07-31-17 @ 09:34) (93% - 98%)  Wt(kg): --  I&O's Summary      Appearance: Normal	  HEENT:   Normal oral mucosa, PERRL, EOMI	  Lymphatic: No lymphadenopathy  Cardiovascular: Normal S1 S2, No JVD, No murmurs, No edema  Respiratory: Lungs clear to auscultation	  Psychiatry: A & O x 3, Mood & affect appropriate  Gastrointestinal:  Soft, Non-tender, + BS	  Skin: No rashes, No ecchymoses, No cyanosis	  Neurologic: Non-focal  Extremities: Normal range of motion, No clubbing, cyanosis or edema  Vascular: Peripheral pulses palpable 2+ bilaterally    TELEMETRY: 	    ECG:    ECHO:  STRESS:  CATH:  	  RADIOLOGY:  CXR:  CT:  US:   	  	  LABS:	 	    CARDIAC MARKERS:                    proBNP:   Lipid Profile:   HgA1c:   TSH:     ASSESSMENT/PLAN: 	  hx of Syncope - mechanical, fell twice in bathroom prior admissions.    Exam with holosystolic murmur - echo with mild AI preserved EF mild inf wall HK, no change in medical mgmt.  Hydration, PT, SW per primary team  replete electrolytes
Chart reviewed; discussed with staff; and patient seen. Patient is a 89 year old unmarried White female with a history of Advanced Dementia and GERD living at home with 24 hour Home Health Aide who was brought to [facility] on 7/29/17 because of increased agitation at home and having tried to hit her HHA. Patient had been taking Zyprexa 2.5mg po hs at home but agitation had worsened. Patient has significant memory impairment. Patient was admitted for treatment of worsening Dementia.            Ct scan of the head revealed no acute intracranial hemorrhage. There were patchy perventricular hypodensities consistent with the sequelae of chronic microangiopathic ischemic disease. following admission patient reported to be irritable and continued on Zyprexa 2.5mg po hs.              Mental Status: Patient is a slender appearing 89 year old White female who appears younger than her stated age and is sitting up in bed. Patient is awake and engagable. Speech is clear and of normal rate and rhythm. Patient is oriented to person and knows she is in the hospital. Is angry and doesn't know why she is here. Affect is labile. Mood is irritable. Thought processes are linear at times and disorganized at other times. There are no auditory or visual hallucinations. There is no suicidal or homicidal ideation. She has limited awareness of her impairment . Memory is severely impaired.              Impression: Continue Zyprexa 2.5mg po at bedtime daily; Supportive therapy; Consider placement. Will discuss.
Patient is a 89y old  Female who presents with a chief complaint of Agitation (29 Jul 2017 11:57)        HPI:  89yo woman with advanced dementia, GERD brought from home with increased agitation. Pt is homebound for dementia, has 24 hour home health aide. She has been hostile and physically abusive to her aides for the last couple of days. Overnight, she was more aggressive so her aide called 911. She has been at her usual state of health, no change in mental status, has good appetite, voids spontaneously. No fever/chills, headache, cough, SOB, chest pain, abdominal pain, dysuria, leg swelling, rash.     ED course: VSS, except for elevated BP for agitation. Her CT head was negative for stroke. Her CBC, CMP are unremarkable. She required Ativan 2mg x 1.     As per Annamarie Barlow ([phone]) who is patient's niece and HCP, pt is DNR/DNI. ED case manager contacted her home agency service who stated that agency is unable to manage her at home anymore. [place] is preferred location for placement. (29 Jul 2017 11:57)    Intermittant agitation- no clinical seizures      Allergies  No Known Allergies      Health Issues  AGGRESSION  No h/o HF  Yes  No pertinent family history in first degree relatives  Handoff  MEWS Score  Alzheimers disease  No pertinent past medical history  No pertinent past medical history  Aggression  DNR (do not resuscitate)  Prophylactic measure  Nutrition, metabolism, and development symptoms  Dementia, senile with delusions  Aggression  Alzheimers disease  CONFUSION  1        FAMILY HISTORY:  No pertinent family history in first degree relatives      MEDICATIONS  (STANDING):  multivitamin 1 Tablet(s) Oral daily  folic acid 1 milliGRAM(s) Oral daily  heparin  Injectable 5000 Unit(s) SubCutaneous every 8 hours  OLANZapine 2.5 milliGRAM(s) Oral with breakfast  OLANZapine 2.5 milliGRAM(s) Oral at bedtime  QUEtiapine 25 milliGRAM(s) Oral at bedtime  sertraline 25 milliGRAM(s) Oral daily  ALPRAZolam 0.25 milliGRAM(s) Oral at bedtime  donepezil 10 milliGRAM(s) Oral at bedtime    MEDICATIONS  (PRN):  haloperidol    Injectable 1 milliGRAM(s) IntraMuscular every 4 hours PRN agitation      PAST MEDICAL & SURGICAL HISTORY:  Alzheimers disease      Labs              Radiology:    Physical Exam    MENTAL STATUS  -Level of Consciousness- awake    Orientation- person, place  Language- aphasia/ dysarthria  Memory- recent and remote-poor      Cranial Nerve 1- 12  Pupils- equal and reactive  Eye movements- no diplopia  Facial - no asymmetry   Lower CN-nl    Gait and Station- n/a    MOTOR  Upper-moves all 4 etremities  Lower- generalized weakness    Reflexes- decreased    Sensation- distal sensory loss    Cerebellar- no tremors    vascular -    Assessment- Dementia    Plan - FU with Dr Paul [facility] Aricept    Obtain 24 hr EEG- R/O seizure ( Seizures can occur in 8-10% of patients with Alzheimer's )   Spoke to Dr Perez    TSH, B12
Patient is a 89y old  Female who presents with a chief complaint of Agitation (29 Jul 2017 11:57)      HPI:  89yo woman with advanced dementia, GERD brought from home with increased agitation. Pt is homebound for dementia, has 24 hour home health aide. She has been hostile and physically abusive to her aides for the last couple of days. Overnight, she was more aggressive so her aide called 911. She has been at her usual state of health, no change in mental status, has good appetite, voids spontaneously. No fever/chills, headache, cough, SOB, chest pain, abdominal pain, dysuria, leg swelling, rash.     ED course: VSS, except for elevated BP for agitation. Her CT head was negative for stroke. Her CBC, CMP are unremarkable. She required Ativan 2mg x 1.     As per Annamarie Barlow ([phone]) who is patient's niece and HCP, pt is DNR/DNI. ED case manager contacted her home agency service who stated that agency is unable to manage her at home anymore. [place] is preferred location for placement. (29 Jul 2017 11:57)      PAST MEDICAL & SURGICAL HISTORY:  Alzheimers disease      MEDICATIONS  (STANDING):  multivitamin 1 Tablet(s) Oral daily  folic acid 1 milliGRAM(s) Oral daily  heparin  Injectable 5000 Unit(s) SubCutaneous every 8 hours  OLANZapine 2.5 milliGRAM(s) Oral with breakfast  OLANZapine 2.5 milliGRAM(s) Oral at bedtime    MEDICATIONS  (PRN):  haloperidol    Injectable 1 milliGRAM(s) IntraMuscular every 4 hours PRN agitation      Social History: lives alone in an elevator accessible apartment building, has private 24hr/ 7 day home attendant    Functional Level Prior to Admission: partial assist with bathing/dressing, walks without assistive devices, has a cane and rolling walker, uses wheelchair in community    FAMILY HISTORY:  No pertinent family history in first degree relatives                        Radiology:    < from: CT Head No Cont (07.29.17 @ 01:34) >  EXAM:  CT BRAIN                          PROCEDURE DATE:  07/29/2017                     INTERPRETATION:    PROCEDURE: CT head without intravenous contrast    INDICATIONS: Altered mental status    TECHNIQUE:  Serial axial images were obtained from the skull base to the   vertex without the use of intravenous contrast. Imaging is performed   using helical low-dose technique, and sagittal and coronal reformations   are provided.    COMPARISON EXAMINATION: 12/30/2016.    FINDINGS:    VENTRICLES AND SULCI: Generalized cerebral volume loss .  Prominence of   the ventricles secondary to periventricular volume loss loss. This is not   significantly changed as compared to 12/30/2016.  INTRA-AXIAL: No acute intracranial hemorrhage or midline shift is   present. Gray-white differentiation is preserved. There are patchy   periventricular hypodensities, which is consistent with the sequela of   chronic microangiopathic ischemic disease.  EXTRA-AXIAL: No extra-axial fluid collection is present.   VISUALIZED SINUSES: The visualized paranasal sinuses are predominantly   clear.  VISUALIZED MASTOIDS:  Clear.  CALVARIUM:  Normal.  MISCELLANEOUS:  None.    IMPRESSION:  No acute intracranial hemorrhage or acute transcortical   infarction.        < from: Xray Chest 1 View AP-PORTABLE IMMEDIATE (07.27.17 @ 18:55) >  EXAM:  XR CHEST 1 VIEW PORT IMMEDIATE                          PROCEDURE DATE:  07/27/2017                     INTERPRETATION:  Chest x-ray    Indication: Weakness     A portable frontal view of the chest is compared to the prior study dated   6/9/2017. Heart size is within normal limits. No pulmonary consolidation   is seen. No large effusion or pneumothorax is identified. Degenerative   changes of the bones.      IMPRESSION: No acute pathology.                Vital Signs Last 24 Hrs  T(C): 36.1 (31 Jul 2017 05:19), Max: 36.3 (30 Jul 2017 11:57)  T(F): 97 (31 Jul 2017 05:19), Max: 97.3 (30 Jul 2017 11:57)  HR: 75 (31 Jul 2017 05:19) (66 - 79)  BP: 101/61 (31 Jul 2017 05:19) (101/61 - 135/82)  BP(mean): --  RR: 14 (31 Jul 2017 05:19) (14 - 14)  SpO2: 95% (31 Jul 2017 05:19) (95% - 98%)    REVIEW OF SYSTEMS:    CONSTITUTIONAL: fatigue  EYES: No eye pain, visual disturbances, or discharge  ENMT:  No difficulty hearing, tinnitus, vertigo; No sinus or throat pain  NECK: No pain or stiffness  BREASTS: No pain, masses, or nipple discharge  RESPIRATORY: No cough, wheezing, chills or hemoptysis; No shortness of breath  CARDIOVASCULAR: No chest pain, palpitations, dizziness, or leg swelling  GASTROINTESTINAL: No abdominal or epigastric pain. No nausea, vomiting, or hematemesis; No diarrhea or constipation. No melena or hematochezia.  GENITOURINARY: No dysuria, frequency, hematuria, or incontinence  NEUROLOGICAL: No headaches, memory loss, loss of strength, numbness, or tremors  SKIN: No itching, burning, rashes, or lesions   LYMPH NODES: No enlarged glands  ENDOCRINE: No heat or cold intolerance; No hair loss  MUSCULOSKELETAL: No joint pain or swelling; No muscle, back, or extremity pain  PSYCHIATRIC: No depression, anxiety, mood swings, or difficulty sleeping  HEME/LYMPH: No easy bruising, or bleeding gums  ALLERGY AND IMMUNOLOGIC: No hives or eczema      Physical Exam: WDWN  woman lying in bed, aide at bedside    HEENT: normocephalic/ atraumatic, anicteric    Neck: supple, negative JVD, negative carotid bruits,    Chest: CTA bilaterally, neg wheeze, rhonchi, rales, crackles, egophany    Cardiovascular: regular rate and rhythm, neg murmurs/rubs/gallops    Abdomen: soft, non distended, non tender, negative rebound/guarding, normal bowel sounds, neg hepatosplenomegaly    Extremities: WWP, neg cyanosis/clubbing/edema, negative calf tenderness to palpation, negative Tonia's sign    Neurologic Exam:    Alert and oriented x 2  to person, place, June 2001,  speech fluent w/o dysarthria, follows commands    Cranial Nerves:     II:                       pupils equal, round and reactive to light, visual fields intact   III/ IV/VI:             extraocular movements intact, neg nystagmus, ptosis  V:                      facial sensation intact, V1-3 normal  VII:                     face symmetric, no droop, normal eye closure and smile  VIII:                    hearing intact to finger rub bilaterally  IX/ X:                  soft palate rise symmetrical  XI:                      head turning, shoulder shrug normal  XII:                     tongue midline    Motor Exam: poor effort    Bilateral UE:      >3+/5     Bilateral LE:      > 3+/5    Sensory: intact to LT/PP in all UE/LE dermatomes    DTR:        = biceps/     triceps/     brachioradialis                 = patella/   medial hamstring/    ankle                 neg clonus                 neg Babinski                 neg Hoffmans    Finger to Nose: did not cooperate    Heel to Pearson:     did not cooperate    Rapid Alternating movements:  did not cooperate    Joint Position Sense:  did not cooperate    Romberg: NT    Tandem Walking: NT    Gait:  NT        PM&R Impression:    1) deconditioned  2) gait dysfunction      Recommendations:    1) Physical therapy focusing on therapeutic exercises, bed mobility/transfer out of bed evaluation, progressive ambulation with assistive devices.    2) Disposition Plan: recommend subacute rehab placement

## 2017-08-03 NOTE — PROGRESS NOTE ADULT - ASSESSMENT
89yF with advanced dementia, GERD presented with increasing agitation being admitted for worsening dementia and placement to a long term care facility for dementia.
89yF with advanced dementia, GERD presented with increasing agitation being admitted for worsening dementia and placement to a long term care facility for dementia.      Problem/Plan - 1:  ·  Problem: Dementia, senile with delusions.  Plan: -Ruled out for stroke or ruled out for stroke or toxic metabolic encephalopathy.  -Patient has been intermittently aggressive and combative  - EKG with normal QT  -Per psych recs, Zyprexa 2.5mg PO in the morning and  Zyprexa 2.5mg PO at bedtime  - Awaiting long term care placement for patients with dementia  - Will limit blood draws   - EKG daily.   -Can give Haldol 1mg PRN if pt becomes to aggressive.     Problem/Plan - 2:  ·  Problem: Alzheimers disease.  Plan: -Advanced dementia from Alzheimers  -Supportive care  -Awaiting placement to long term care facility for pts with dementia.
psych and neuro eval then social work

## 2017-08-03 NOTE — PROGRESS NOTE ADULT - PROBLEM SELECTOR PLAN 2
-Advanced dementia from Alzheimers  -Supportive care  -Awaiting placement to long term care facility for pts with dementia.  -Per Dr. Perez, patient normally takes Zoloft 25mg, Donepezil 10mg, Xanax 0.25mg. Restarted home meds yesterday

## 2017-08-03 NOTE — PROGRESS NOTE ADULT - SUBJECTIVE AND OBJECTIVE BOX
PGY-1 Progress Note    SUBJECTIVE:    Patient is a 89y old  Female who presents with a chief complaint of Agitation (29 Jul 2017 11:57)        Interval HPI:  Patient had no episodes of agitation or aggression last night or today and has rested comfortably. Her home medications were started yesterday, including Donepezil, Zoloft, and Xanax at bedtime. Still awaiting placement in a long term care facility      Review of Systems:  Constitutional: no malaise, fatigue, fevers  HEENT: no headache, sore throat, rhinorrhea  Respiratory: no cough, SOB, chest pain  Cardiac: no palpitations, chest pain  Vascular: no leg swelling  Gastrointestinal: no nausea, vomiting, diarrhea or constipation  Genitourinary: no dysuria, nocturia or polyuria  MSK: no joint or muscle pain  Skin: no rashes  Neuro: no headaches, focal weakness or numbness  Psych: no depression or anxiety      OBJECTIVE:    Vital Signs Last 24 Hrs  T(C): 36.4 (03 Aug 2017 12:14), Max: 36.6 (03 Aug 2017 06:07)  T(F): 97.5 (03 Aug 2017 12:14), Max: 97.8 (03 Aug 2017 06:07)  HR: 89 (03 Aug 2017 12:14) (72 - 89)  BP: 168/81 (03 Aug 2017 12:14) (145/61 - 168/81)  BP(mean): --  RR: 16 (03 Aug 2017 12:14) (15 - 16)  SpO2: 97% (03 Aug 2017 12:14) (95% - 97%)    Height (cm): 170.18 (07-29 @ 13:06)  Weight (kg): 54.5 (07-29 @ 13:06)  BMI (kg/m2): 18.8 (07-29 @ 13:06)    CAPILLARY BLOOD GLUCOSE            Physical Exam:  General: No acute distress  Head: Normocephalic; atraumatic  Eyes: PERRL, EOM intact; conjunctiva and sclera clear.   ENMT: Moist mucus membranes, No nasal discharge; airway clear  Neck: Supple; non tender; no masses  Respiratory: No wheezes, rales or rhonchi  Cardiovascular: Regular rate and rhythm. S1 and S2 Normal; No murmurs, gallops or rubs  Gastrointestinal: Soft non-tender non-distended; Normal bowel sounds; No hepatosplenomegaly  Genitourinary: No costovertebral angle tenderness  Extremities: Normal range of motion, No clubbing, cyanosis or edema  MSK: PROM of joints, no joint swelling. Muscle weakness present b/l UE LE 4/5  Skin: No rash or lesions      MEDICATIONS  (STANDING):  multivitamin 1 Tablet(s) Oral daily  folic acid 1 milliGRAM(s) Oral daily  heparin  Injectable 5000 Unit(s) SubCutaneous every 8 hours  OLANZapine 2.5 milliGRAM(s) Oral with breakfast  OLANZapine 2.5 milliGRAM(s) Oral at bedtime  QUEtiapine 25 milliGRAM(s) Oral at bedtime  sertraline 25 milliGRAM(s) Oral daily  ALPRAZolam 0.25 milliGRAM(s) Oral at bedtime  donepezil 10 milliGRAM(s) Oral at bedtime    MEDICATIONS  (PRN):  haloperidol    Injectable 1 milliGRAM(s) IntraMuscular every 4 hours PRN agitation      Allergies:  Allergies    No Known Allergies    Intolerances        Labs:                 Microbiology:      Radiology:

## 2017-08-03 NOTE — PROGRESS NOTE ADULT - PROBLEM SELECTOR PROBLEM 2
Aggression
Alzheimers disease

## 2017-08-03 NOTE — PROGRESS NOTE ADULT - PROBLEM SELECTOR PROBLEM 1
Aggression
Alzheimers disease
Dementia, senile with delusions

## 2017-08-03 NOTE — PROGRESS NOTE ADULT - PROBLEM SELECTOR PLAN 1
-Ruled out for stroke or ruled out for stroke or toxic metabolic encephalopathy.  -Patient has been intermittently aggressive and combative  - EKG with normal QT  -Per psych recs, Zyprexa 2.5mg PO in the morning and  Zyprexa 2.5mg PO at bedtime  Restarted home meds, Zoloft, Donepezil, and Xanax at bedtime.  - Awaiting long term care placement for patients with dementia  - Will limit blood draws   - EKG daily.   -Can give Haldol 1mg PRN if pt becomes to aggressive. -Ruled out for stroke or ruled out for stroke or toxic metabolic encephalopathy.  -Patient has been intermittently aggressive and combative  - EKG with normal QT  -Per psych recs, Zyprexa 2.5mg PO in the morning and  Zyprexa 5mg PO at bedtime  Restarted home meds, Zoloft, Donepezil, and Xanax at bedtime.  - Awaiting long term care placement for patients with dementia  - Will limit blood draws   - EKG daily.   -Can give Haldol 1mg PRN if pt becomes to aggressive.

## 2017-08-03 NOTE — PROGRESS NOTE ADULT - SUBJECTIVE AND OBJECTIVE BOX
I examined the patient today, reviewed the lab data, xrays and additional studies. Additionally I have reviewed the notes and assessments by the residents and consultants.   At this point I agree with the assessments and plan.  I would also advise close observation, and supportive care.  Weaker this AM  Recheck labs     Please call  neuro consuly with dr Donis   Discussed with dr bushra melchor who is following her

## 2017-08-04 ENCOUNTER — TRANSCRIPTION ENCOUNTER (OUTPATIENT)
Age: 82
End: 2017-08-04

## 2017-08-04 VITALS
TEMPERATURE: 97 F | DIASTOLIC BLOOD PRESSURE: 74 MMHG | SYSTOLIC BLOOD PRESSURE: 157 MMHG | RESPIRATION RATE: 15 BRPM | OXYGEN SATURATION: 96 % | HEART RATE: 70 BPM

## 2017-08-04 PROCEDURE — 36415 COLL VENOUS BLD VENIPUNCTURE: CPT

## 2017-08-04 PROCEDURE — 99285 EMERGENCY DEPT VISIT HI MDM: CPT | Mod: 25

## 2017-08-04 PROCEDURE — 85025 COMPLETE CBC W/AUTO DIFF WBC: CPT

## 2017-08-04 PROCEDURE — 84484 ASSAY OF TROPONIN QUANT: CPT

## 2017-08-04 PROCEDURE — 82553 CREATINE MB FRACTION: CPT

## 2017-08-04 PROCEDURE — 85610 PROTHROMBIN TIME: CPT

## 2017-08-04 PROCEDURE — 71045 X-RAY EXAM CHEST 1 VIEW: CPT

## 2017-08-04 PROCEDURE — 82550 ASSAY OF CK (CPK): CPT

## 2017-08-04 PROCEDURE — 97116 GAIT TRAINING THERAPY: CPT

## 2017-08-04 PROCEDURE — 70450 CT HEAD/BRAIN W/O DYE: CPT

## 2017-08-04 PROCEDURE — 80053 COMPREHEN METABOLIC PANEL: CPT

## 2017-08-04 PROCEDURE — 81003 URINALYSIS AUTO W/O SCOPE: CPT

## 2017-08-04 PROCEDURE — 87086 URINE CULTURE/COLONY COUNT: CPT

## 2017-08-04 PROCEDURE — 97110 THERAPEUTIC EXERCISES: CPT

## 2017-08-04 PROCEDURE — 96372 THER/PROPH/DIAG INJ SC/IM: CPT

## 2017-08-04 PROCEDURE — 93005 ELECTROCARDIOGRAM TRACING: CPT

## 2017-08-04 PROCEDURE — 85730 THROMBOPLASTIN TIME PARTIAL: CPT

## 2017-08-04 PROCEDURE — 97162 PT EVAL MOD COMPLEX 30 MIN: CPT

## 2017-08-04 RX ORDER — HALOPERIDOL DECANOATE 100 MG/ML
1 INJECTION INTRAMUSCULAR ONCE
Qty: 0 | Refills: 0 | Status: COMPLETED | OUTPATIENT
Start: 2017-08-04 | End: 2017-08-04

## 2017-08-04 RX ORDER — SERTRALINE 25 MG/1
1 TABLET, FILM COATED ORAL
Qty: 0 | Refills: 0 | COMMUNITY
Start: 2017-08-04

## 2017-08-04 RX ORDER — ALPRAZOLAM 0.25 MG
1 TABLET ORAL
Qty: 30 | Refills: 0 | OUTPATIENT
Start: 2017-08-04 | End: 2017-09-03

## 2017-08-04 RX ORDER — DONEPEZIL HYDROCHLORIDE 10 MG/1
1 TABLET, FILM COATED ORAL
Qty: 0 | Refills: 0 | COMMUNITY
Start: 2017-08-04

## 2017-08-04 RX ORDER — ALPRAZOLAM 0.25 MG
1 TABLET ORAL
Qty: 0 | Refills: 0 | COMMUNITY
Start: 2017-08-04

## 2017-08-04 RX ORDER — OLANZAPINE 15 MG/1
1 TABLET, FILM COATED ORAL
Qty: 0 | Refills: 0 | COMMUNITY
Start: 2017-08-04

## 2017-08-04 RX ADMIN — OLANZAPINE 2.5 MILLIGRAM(S): 15 TABLET, FILM COATED ORAL at 07:02

## 2017-08-04 RX ADMIN — HEPARIN SODIUM 5000 UNIT(S): 5000 INJECTION INTRAVENOUS; SUBCUTANEOUS at 07:02

## 2017-08-04 RX ADMIN — Medication 1 TABLET(S): at 11:42

## 2017-08-04 RX ADMIN — SERTRALINE 25 MILLIGRAM(S): 25 TABLET, FILM COATED ORAL at 11:42

## 2017-08-04 RX ADMIN — Medication 1 MILLIGRAM(S): at 11:42

## 2017-08-04 RX ADMIN — HALOPERIDOL DECANOATE 1 MILLIGRAM(S): 100 INJECTION INTRAMUSCULAR at 15:39

## 2017-08-04 NOTE — DISCHARGE NOTE ADULT - PATIENT PORTAL LINK FT
“You can access the Follow[facility] Patient Portal, offered by [facility], by registering with the following website: [email or web address]”
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
## 2017-08-04 NOTE — DISCHARGE NOTE ADULT - MEDICATION SUMMARY - MEDICATIONS TO CHANGE
I will SWITCH the dose or number of times a day I take the medications listed below when I get home from the hospital:    ZyPREXA 2.5 mg oral tablet  -- 1 tab(s) by mouth once a day (at bedtime)  -- It is very important that you take or use this exactly as directed.  Do not skip doses or discontinue unless directed by your doctor.  May cause drowsiness.  Alcohol may intensify this effect.  Use care when operating dangerous machinery.  Obtain medical advice before taking any non-prescription drugs as some may affect the action of this medication.

## 2017-08-04 NOTE — DISCHARGE NOTE ADULT - CARE PLAN
Principal Discharge DX:	Dementia  Goal:	Place in a safe and monitored environment and reduce symptoms/progression  Instructions for follow-up, activity and diet:	-You should continue taking your prescribed medications for treatment of dementia symptoms  -Your medications are Olanzipine 2.5mg in the morning and 5mg at bedtime, Xanax 0.25mg at bedtime, Zoloft 25mg once daily  Secondary Diagnosis:	Alzheimers disease  Goal:	Reduce symptoms and progression  Instructions for follow-up, activity and diet:	- You should continue taking your prescribed medication for dementia in addition to Donepezil 10mg at bedtime for Alzheimer's.

## 2017-08-04 NOTE — DISCHARGE NOTE ADULT - MEDICATION SUMMARY - MEDICATIONS TO STOP TAKING
I will STOP taking the medications listed below when I get home from the hospital:    Bactrim  mg-160 mg oral tablet  -- 1 tab(s) by mouth every 12 hours  -- Avoid prolonged or excessive exposure to direct and/or artificial sunlight while taking this medication.  Finish all this medication unless otherwise directed by prescriber.  Medication should be taken with plenty of water.

## 2017-08-04 NOTE — DISCHARGE NOTE ADULT - MEDICATION SUMMARY - MEDICATIONS TO TAKE
I will START or STAY ON the medications listed below when I get home from the hospital:    sertraline 25 mg oral tablet  -- 1 tab(s) by mouth once a day  -- Indication: For Dementia    OLANZapine 2.5 mg oral tablet  -- 1 tab(s) by mouth once a day (before a meal)  -- Indication: For Dementia    OLANZapine 5 mg oral tablet  -- 1 tab(s) by mouth once a day (at bedtime)  -- Indication: For Dementia    ALPRAZolam 0.25 mg oral tablet  -- 1 tab(s) by mouth once a day (at bedtime)  -- Indication: For Dementia    donepezil 10 mg oral tablet  -- 1 tab(s) by mouth once a day (at bedtime)  -- Indication: For Alzheimers disease    Multiple Vitamins oral tablet  -- 1 tab(s) by mouth once a day  -- Indication: For Vitamins    folic acid 1 mg oral tablet  -- 1 tab(s) by mouth once a day  -- Indication: For Vitamins
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
## 2017-08-04 NOTE — DISCHARGE NOTE ADULT - PLAN OF CARE
Place in a safe and monitored environment and reduce symptoms/progression -You should continue taking your prescribed medications for treatment of dementia symptoms  -Your medications are Olanzipine 2.5mg in the morning and 5mg at bedtime, Xanax 0.25mg at bedtime, Zoloft 25mg once daily - You should continue taking your prescribed medication for dementia in addition to Donepezil 10mg at bedtime for Alzheimer's. Reduce symptoms and progression

## 2017-08-04 NOTE — DISCHARGE NOTE ADULT - CARE PROVIDER_API CALL
Jose Francisco Perez), Internal Medicine  [address] [address]  Phone: [phone]  Fax: [phone]

## 2017-08-07 DIAGNOSIS — Z66 DO NOT RESUSCITATE: ICD-10-CM

## 2017-08-07 DIAGNOSIS — F03.91 UNSPECIFIED DEMENTIA WITH BEHAVIORAL DISTURBANCE: ICD-10-CM

## 2017-08-07 DIAGNOSIS — F02.81 DEMENTIA IN OTHER DISEASES CLASSIFIED ELSEWHERE, UNSPECIFIED SEVERITY, WITH BEHAVIORAL DISTURBANCE: ICD-10-CM

## 2017-08-07 DIAGNOSIS — K21.9 GASTRO-ESOPHAGEAL REFLUX DISEASE WITHOUT ESOPHAGITIS: ICD-10-CM

## 2017-08-07 DIAGNOSIS — G30.1 ALZHEIMER'S DISEASE WITH LATE ONSET: ICD-10-CM

## 2018-01-14 NOTE — PROGRESS NOTE ADULT - PROBLEM SELECTOR PROBLEM 5
Assessment    1  Former smoker (V15 82) (P20 171)   2  Thyroid cancer (193) (C73)    Plan  Thyroid cancer    · Fluzone High-Dose 0 5 ML Intramuscular Suspension Prefilled Syringe;  INJECT 0 5  ML Intramuscular; To Be Done: [id number]   For: Thyroid cancer; Ordered By:Dell Andrade; Effective Date:20Sep2016    Discussion/Summary  Discussion Summary:   She is here for a transition to care visit  She looks absolutely wonderful  She had a really dehydrated from her chemotherapy  She is currently not on anything, but she plans to go back to [facility] and in no decide whether to try a lower dose or a different different from the treatment altogether, but she's had really remarkable response with her thyroid cancer  She's had shrinkage of her her tumor sites at the thyroid and in her right upper shoulder  She's down a little bit in weight, but I doubt that she's home and she will need her home  She'll probably preload weight back on  We'll give her a flu shot today  We'll see her back in about 3-4 months  History of Present Illness  TCM Communication [facility]: The patient is being contacted for follow-up after hospitalization and Nurse Seth Slade called to schedule CHANDU appt on 9/20/2016 at 2:30 pm  [facility]  The date of admission: 08/15/2016, date of discharge: 08/18/2016, and transferred to [facility] from 8/18/16 to 9/14/16  Diagnosis: A-fib; UTI; Generalized weakness D/C dx -Acute kidney injury; Atrial fibrillation; UTI; Unspecified abnormalities of gait and mobility  She was discharged to home, with home health services, [facility] with PT/OT  Medications were not reviewed today  She scheduled a follow up appointment  The patient is currently asymptomatic  Per nurse Mays Killer was provided to  Nurse Seth Slade     Communication performed and completed by Rosalie Rocha      Review of Systems  Complete-Female:   Constitutional: No fever, no chills, feels well, no tiredness, no recent weight gain or weight loss  Eyes: No complaints of eye pain, no red eyes, no eyesight problems, no discharge, no dry eyes, no itching of eyes  ENT: no complaints of earache, no loss of hearing, no nose bleeds, no nasal discharge, no sore throat, no hoarseness  Cardiovascular: No complaints of slow heart rate, no fast heart rate, no chest pain, no palpitations, no leg claudication, no lower extremity edema  Respiratory: No complaints of shortness of breath, no wheezing, no cough, no SOB on exertion, no orthopnea, no PND  Gastrointestinal: No complaints of abdominal pain, no constipation, no nausea or vomiting, no diarrhea, no bloody stools  Genitourinary: No complaints of dysuria, no incontinence, no pelvic pain, no dysmenorrhea, no vaginal discharge or bleeding  Musculoskeletal: No complaints of arthralgias, no myalgias, no joint swelling or stiffness, no limb pain or swelling  Integumentary: No complaints of skin rash or lesions, no itching, no skin wounds, no breast pain or lump  Neurological: No complaints of headache, no confusion, no convulsions, no numbness, no dizziness or fainting, no tingling, no limb weakness, no difficulty walking  Psychiatric: Not suicidal, no sleep disturbance, no anxiety or depression, no change in personality, no emotional problems  Endocrine: as noted in HPI  ROS Reviewed:   ROS reviewed  Active Problems    1  Benign essential hypertension (401 1) (I10)   2  Bilateral knee pain (719 46) (M25 561,M25 562)   3  Chronic diastolic congestive heart failure (428 32,428 0) (I50 32)   4  Congestive heart failure (428 0) (I50 9)   5  Coronary artery disease involving native coronary artery of native heart without angina   pectoris (414 01) (I25 10)   6  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)   7  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)   8  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)   9  Fatigue (780 79) (R53 83)   10   Hypothyroidism (244 9) (E03 9)   11  Iron deficiency anemia due to chronic blood loss (280 0) (D50 0)   12  Lymphedema (457 1) (I89 0)   13  Need for pneumococcal vaccination (V03 82) (Z23)   14  Nontoxic single thyroid nodule (241 0) (E04 1)   15  Osteoarthritis of knee (715 36) (M17 9)   16  Osteoarthritis, localized, knee (715 36) (M17 9)   17  Permanent atrial fibrillation (427 31) (I48 2)   18  Subcutaneous nodule (782 2) (R22 9)   19  Tachy-aaron syndrome (427 81) (I49 5)   20  Thyroid cancer (193) (C73)    Past Medical History    1  History of Acute Myocardial Infarction (V12 59)   2  History of Dyslipidemia (272 4) (E78 5)   3  History of Easy Bruising Tendency   4  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)   5  History of Glaucoma screening (V80 1) (Z13 5)   6  History of High risk medication use (V58 69) (Z79 899)   7  History of allergic rhinitis (V12 69) (Z87 09)   8  History of vasomotor rhinitis (V12 69) (Z87 09)   9  History of Joint pain, knee (719 46) (M25 569)   10  History of Need for influenza vaccination (V04 81) (Z23)   11  History of Strain Of Extensor Muscle, Fascia, And Tendon Of Finger At Wrist And Hand    Level (848 8)    Surgical History    1  History of Arterial Embolectomy   2  History of Biopsy Thyroid Using Percutaneous Core Needle   3  History of Cataract Surgery   4  History of Fine Needle Aspiration   5  History of Pacemaker Placement   6  History of Tonsillectomy With Adenoidectomy   7  History of Treatment Of Hip Fracture  Surgical History Reviewed: The surgical history was reviewed and updated today  Family History  Mother    1  Family history of Coronary artery disease (414 00) (I25 10)   2  Family history of hypothyroidism (V18 19) (Z83 49)  Father    3  Family history of Coal workers' pneumoconiosis (87 89 79) (J60)  Family History Reviewed: The family history was reviewed and updated today         Social History    · Being A Social Drinker   · Denied: Drug use (305 90) (F19 90)   · Former smoker (V15 82) (C95 443)    Current Meds   1  Aspirin 81 MG TABS; Take 1 tablet daily Recorded   2  Atorvastatin Calcium 10 MG Oral Tablet; take 1 tablet by mouth once daily; Therapy: 95IXF6603 to (Evaluate:66Qbq2446)  Requested for: 26Apr2016; Last   Rx:26Apr2016 Ordered   3  Centrum Silver TABS; Take 1 tablet daily Recorded   4  Diltiazem HCl - 30 MG Oral Tablet; TAKE 1 TABLET BY MOUTH EVERY MORNING AND 1   TABLET EVERY EVENING; Therapy: 18VSB4786 to (Evaluate:96Uib5495)  Requested for: 42Ktx7998; Last   Rx:[id number] Ordered   5  Furosemide 40 MG Oral Tablet; Take 1 tablet daily as directed; Therapy: 21CSB7133 to (Justino Aguilar)  Requested for: 85Aux4866; Last   Rx:[id number] Ordered   6  Ipratropium Bromide 0 06 % Nasal Solution; USE 2 SPRAYS INTO EACH NOSTRIL   TWICE DAILY AS DIRECTED; Therapy: 18ODZ1502 to (Triny Dockery)  Requested for: 99 708640; Last   Rx:06Apr2015 Ordered   7  Levothyroxine Sodium 100 MCG Oral Tablet; take 1 tablet by mouth once daily; Therapy: 29AEZ5280 to (Justino Aguilar)  Requested for: 30BRZ6194; Last   Rx:[id number] Ordered   8  Metoprolol Tartrate 25 MG Oral Tablet; take 1 tablet by mouth twice a day; Therapy: 19Jii3799 to (Evaluate:20Nov2016)  Requested for: 73Kii5899; Last   Rx:[id number] Ordered   9  Oscal 500/200 D-3 TABS; Take 1 tablet daily as directed Recorded   10  Tylenol 8 Hour TBCR; TAKE 1 TABLET Twice daily PRN Mild pain Recorded   11  Vitamin D3 2000 UNIT Oral Capsule; TAKE 1 CAPSULE Daily Recorded   12  Xarelto 15 MG Oral Tablet; take 1 tablet by mouth once daily; Therapy: 74XXB3471 to (Evaluate:74Joy9455)  Requested for: 33JCW5804; Last    Rx:[id number] Ordered  Medication List Reviewed: The medication list was reviewed and updated today  Allergies    1  Cortisone Acetate TABS    2   Other    Vitals  Signs   Recorded: 63SMI7400 06:01VW   Systolic: 125  Diastolic: 68  Height: 5 ft 3 in  Weight: 116 lb 3 2 oz  BMI Calculated: 20 58  BSA Calculated: 1 54    Physical Exam    Constitutional   General appearance: No acute distress, well appearing and well nourished  Eyes   Conjunctiva and lids: No swelling, erythema or discharge  Pupils and irises: Equal, round and reactive to light  Ears, Nose, Mouth, and Throat   External inspection of ears and nose: Normal     Otoscopic examination: Tympanic membranes translucent with normal light reflex  Canals patent without erythema  Nasal mucosa, septum, and turbinates: Normal without edema or erythema  Oropharynx: Normal with no erythema, edema, exudate or lesions  Pulmonary   Respiratory effort: No increased work of breathing or signs of respiratory distress  Auscultation of lungs: Clear to auscultation  Cardiovascular   Palpation of heart: Normal PMI, no thrills  Auscultation of heart: Normal rate and rhythm, normal S1 and S2, without murmurs  Examination of extremities for edema and/or varicosities: Normal     Carotid pulses: Normal     Abdomen   Abdomen: Non-tender, no masses  Liver and spleen: No hepatomegaly or splenomegaly  Lymphatic   Palpation of lymph nodes in neck: No lymphadenopathy  Musculoskeletal   Gait and station: Normal     Digits and nails: Normal without clubbing or cyanosis  Inspection/palpation of joints, bones, and muscles: Normal     Skin   Skin and subcutaneous tissue: Normal without rashes or lesions  Neurologic   Cranial nerves: Cranial nerves 2-12 intact  Reflexes: 2+ and symmetric  Sensation: No sensory loss  Psychiatric   Orientation to person, place, and time: Normal     Mood and affect: Normal          Health Management  Benign essential hypertension   (1) COMPREHENSIVE METABOLIC PANEL; every 6 months; Last 15Apr2015; Next Due: 24SOY7585; Overdue  (1) LIPID PANEL, FASTING; every 1 year; Last 15Apr2015; Next Due: 84Hbb4916; Overdue  Health Maintenance   Medicare Annual Wellness Visit; every 1 year; Last 12MGU0138;  Next Due: 41PZX0862; Near Due    Future Appointments    Date/Time Provider Specialty Site   06/22/2017 11:30 AM Cardiology, [address]   10/18/2016 02:15 PM Cardiology, Device TTM   [address]   11/23/2016 11:30 AM Cardiology, Device TTM   [address]   09/20/2016 05:15 PM Zita Madison DO [facility]     Signatures   Electronically signed by : Serenity Ascencio, ; Sep 15 2016  3:00PM EST                       (Author)    Electronically signed by : Serenity Ascencio, ; Sep 15 2016  3:24PM EST                       (Author)    Electronically signed by : Callum Vanegas DO; Sep 20 2016  5:30PM EST                       (Author) DNR (do not resuscitate)

## 2019-03-11 NOTE — ED PROVIDER NOTE - CARE PLAN
Impression: Diagnosis: Open angle with borderline findings, low risk, bilateral. Code: H40.013. Side: OU. IOP OU good today VF OU showing changes? secondary to ARMD. Plan: Will continue to monitor. no treatment needed at this time. Needs updated tests. Principal Discharge DX:	Aggression

## 2020-02-12 NOTE — PHYSICAL THERAPY INITIAL EVALUATION ADULT - SIT-TO-STAND BALANCE
Chief Complaint   Patient presents with    Cough     follow up, cough getting worse and is dry     HISTORY OF PRESENT ILLNESS   HPI  Patient complains of persistent non productive, dry, irritative cough. Last seen here when she presented to [facility] care and had multiple complaints/concerns at that time. The cough was one of many. At that time she also mentioned having had a then 3 week h/o right upper chest wall pain/soreness and soreness right anterior lower rib margin. This symptom was very vague and ill defined. Since that time interim PA chest film showed no acute process but trace right pleural effusion of unknown etiology. I ordered a mammogram at that time as well which she reported having done same day as the xray but no report has been dictated at this time (I have asked that my nurse check into this today asap). The cough has persisted since that visit and remains unchanged. States it is not a bad/severe cough, it is mild but just wont go away. Denies chest pain, sob, wheezing, fever, sputum or pleuritic pain. Denies ROGER. Feels slight tightness in right upper chest but not restricted or difficulty breathing. Denies prior asthma/copd hx but her son has asthma. Lifelong nonsmoker. Denies sinus/nasal congestion. Endorses some slight post nasal drainage and tickling in throat. Has fullness sensation/lump right side of throat, feels like there \"is something growing on there\". Denies choking, dysphagia, odynophagia or hoarseness. Denies fevers, chills, weight loss. Today she denies having any type of acid reflux or heartburn, but last visit on ROS she commented as having mild acid reflux but not significant enough for her to feel she has needed to take anything for it    She was diagnosed w/ nodular cystic goiter back in 2013 w/ benign biopsies but ended up having total thyroidectomy in 2015 for enlarging goiter and obstructive sx.   She has been on TRT since that time, previously managed by an Endocrinologist then transitioned to her PCP. REVIEW OF SYMPTOMS   Review of Systems   Constitutional: Negative for chills, fever and weight loss. HENT: Negative for sore throat. Respiratory: Positive for cough. Negative for sputum production, shortness of breath and wheezing. Cardiovascular: Negative. Negative for chest pain and palpitations. Gastrointestinal: Negative. Genitourinary: Negative. Neurological: Negative.             PROBLEM LIST/MEDICAL HISTORY     Problem List  Date Reviewed: 2020          Codes Class Noted    S/P total thyroidectomy ICD-10-CM: E89.0  ICD-9-CM: V45.89  2020    Overview Signed 2020 11:45 AM by Sherlyn Mortensen MD     Surgery 2015 for Multinodular Goiter, Cystic masses, Benign             Benign essential hypertension ICD-10-CM: I10  ICD-9-CM: 401.1  2020    Overview Signed 2020 11:47 AM by Sherlyn Mortensen MD     Diagnosed in her 30's             Complete hearing loss of right ear ICD-10-CM: H91.91  ICD-9-CM: 389.9  2020    Overview Signed 2020 11:51 AM by Sherlyn Mortensen MD     [id number], s/p trauma w/ resultant surgery ending in complete hearing loss             Postoperative hypothyroidism ICD-10-CM: E89.0  ICD-9-CM: 244.0  2020    Overview Signed 2020 12:21 PM by Sherlyn Mortensen MD     S/p total thyroidectomy 2015 for nodular/cystic goiter, benign                       PAST SURGICAL HISTORY     Past Surgical History:   Procedure Laterality Date    HX BREAST BIOPSY Left     Benign Cyst Removed    HX  SECTION  ,     HX COLONOSCOPY  2015    Normal done in [facility], repeat in 5 yrs due to family hx    HX CYST REMOVAL      Skin on back of neck    HX HEENT      Right Ear Surgery, Traumatic Scars    HX PARTIAL HYSTERECTOMY  in her 40's    Uterine Fibroids, Left Ovarian Cysts-Left Oopherectomy    HX THYROIDECTOMY  2015    Cystic Goiter    IR FINE NEEDLE ASPIRATION THYROID  2013    Benign Cysts         MEDICATIONS     Current Outpatient Medications   Medication Sig    SYNTHROID 88 mcg tablet Take 88 mcg by mouth Daily (before breakfast). -Take 1 & 1/2 tabs once a day on Mon-Thur  -Take 1/2 tab on Fri  -Skip on Sat & Sun    DYAZIDE 37.5-25 mg per capsule Take 1 Cap by mouth daily. No current facility-administered medications for this visit. ALLERGIES     Allergies   Allergen Reactions    Sulfa (Sulfonamide Antibiotics) Swelling     Facial swelling           SOCIAL HISTORY     Social History     Socioeconomic History    Marital status:      Spouse name: Not on file    Number of children: 2    Years of education: Not on file    Highest education level: Not on file   Occupational History    Occupation: Contractor/ for the [employer]   Tobacco Use    Smoking status: Never Smoker    Smokeless tobacco: Never Used   Substance and Sexual Activity    Alcohol use: Yes     Comment: occsaional glass of wine    Drug use: Never    Sexual activity: Yes   Social History Narrative    Native of [place] where all of her family still resides. 1 son in [place], but 1 daughter in [address] [place] [address]. 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 Lives in [facility] near [place].          IMMUNIZATIONS  Immunization History   Administered Date(s) Administered    Tdap 10/30/2019         FAMILY HISTORY     Family History   Problem Relation Age of Onset    Lung Cancer Mother          age 68, former smoker    Breast Cancer Mother         diagnosed w/ breast cancer in her late 50's/early 63's, surgery, survivor     Hypertension Mother     Prostate Cancer Father          in his early [de-identified] Hypertension Sister     Diabetes Sister     Breast Cancer Sister         diagnosed in her early 42's, treated w/ radiation and surgery    Prostate Cancer Brother         survivor    Colon Cancer Brother         diagnosed in his 52's    Asthma Son     No Known Problems Daughter     Hypertension Sister     High Cholesterol Sister     Hypertension Brother     Obesity Brother     No Known Problems Brother     Heart Disease Brother          VITALS     Visit Vitals  /86 (BP 1 Location: Left arm, BP Patient Position: Sitting)   Pulse 75   Temp 98.4 °F (36.9 °C) (Oral)   Resp 16   Ht 5' 5.25\" (1.657 m)   Wt 183 lb 6.4 oz (83.2 kg)   SpO2 99%   BMI 30.29 kg/m²          PHYSICAL EXAMINATION   Physical Exam  Vitals signs reviewed. Constitutional:       General: She is not in acute distress. Appearance: Normal appearance. She is not ill-appearing. HENT:      Nose: Congestion present. Mouth/Throat:      Mouth: Mucous membranes are moist.      Pharynx: Oropharynx is clear. Neck:        Comments: Illustration denotes linear horizontal surgical scar from prior thyroidectomy. Circled region denotes area of palpable fullness right anterior lateral neck, ill defined dime to nickel size, soft mass, mildly tender  Cardiovascular:      Rate and Rhythm: Normal rate and regular rhythm. Heart sounds: No murmur. No gallop. Pulmonary:      Effort: Pulmonary effort is normal. No respiratory distress. Breath sounds: Normal breath sounds. No wheezing or rales. Lymphadenopathy:      Head:      Right side of head: No submandibular adenopathy. Left side of head: No submandibular adenopathy. Cervical:      Right cervical: No posterior cervical adenopathy. Left cervical: No posterior cervical adenopathy. Upper Body:      Right upper body: No supraclavicular adenopathy. Left upper body: No supraclavicular adenopathy.        NOTED FROM VISIT & EXAM 1-  Chest:      Chest wall: Tenderness (reproducible tenderness right upper chest wall muscles and intercostal muscles; mild tenderness along right anterior lower rib margin) present. Abdominal:      General: Bowel sounds are normal.      Palpations: Abdomen is soft. There is no mass. Tenderness: There is no guarding or rebound. Negative signs include Holloway's sign. Comments: Mild tenderness directly behind right lower rib margin in RUQ. No palpable masses, no G/R.     DIAGNOSTIC DATA         LABORATORY DATA     Results for orders placed or performed in visit on 01/21/20   CBC W/O DIFF   Result Value Ref Range    WBC 9.0 3.4 - 10.8 x10E3/uL    RBC 5.38 (H) 3.77 - 5.28 x10E6/uL    HGB 13.2 11.1 - 15.9 g/dL    HCT 40.8 34.0 - 46.6 %    MCV 76 (L) 79 - 97 fL    MCH 24.5 (L) 26.6 - 33.0 pg    MCHC 32.4 31.5 - 35.7 g/dL    RDW 14.1 11.7 - 15.4 %    PLATELET 665 731 - 152 I40D2/IR   METABOLIC PANEL, COMPREHENSIVE   Result Value Ref Range    Glucose 88 65 - 99 mg/dL    BUN 15 8 - 27 mg/dL    Creatinine 0.95 0.57 - 1.00 mg/dL    GFR est non-AA 63 >59 mL/min/1.73    GFR est AA 73 >59 mL/min/1.73    BUN/Creatinine ratio 16 12 - 28    Sodium 142 134 - 144 mmol/L    Potassium 3.9 3.5 - 5.2 mmol/L    Chloride 98 96 - 106 mmol/L    CO2 26 20 - 29 mmol/L    Calcium 10.4 (H) 8.7 - 10.3 mg/dL    Protein, total 7.6 6.0 - 8.5 g/dL    Albumin 4.7 3.8 - 4.8 g/dL    GLOBULIN, TOTAL 2.9 1.5 - 4.5 g/dL    A-G Ratio 1.6 1.2 - 2.2    Bilirubin, total 0.3 0.0 - 1.2 mg/dL    Alk. phosphatase 84 39 - 117 IU/L    AST (SGOT) 15 0 - 40 IU/L    ALT (SGPT) 17 0 - 32 IU/L   TSH 3RD GENERATION   Result Value Ref Range    TSH 0.021 (L) 0.450 - 4.500 uIU/mL   T4, FREE   Result Value Ref Range    T4, Free 1.74 0.82 - 1.77 ng/dL   T3 TOTAL   Result Value Ref Range    T3, total 95 71 - 180 ng/dL   HEPATITIS C AB   Result Value Ref Range    Hep C Virus Ab <0.1 0.0 - 0.9 s/co ratio          ASSESSMENT & PLAN       ICD-10-CM ICD-9-CM    1. Chronic cough R05 786.2 budesonide-formoteroL (SYMBICORT) 80-4.5 mcg/actuation HFAA   2.  Allergic cough R05 786.2 budesonide-formoteroL (SYMBICORT) 80-4.5 mcg/actuation HFAA      cetirizine (ZYRTEC) 10 mg tablet   3. Right-sided chest wall pain R07.9 786.50    4. Right upper quadrant abdominal tenderness without rebound tenderness R10.811 789.61    5. Localized swelling, mass or lump of neck R22.1 784.2 US THYROID/PARATHYROID/SOFT TISS   6. History of multinodular goiter Z86.39 V12.29    7. S/P total thyroidectomy E89.0 V45.89      2-3-2020: Right Rib Films and PA CXR: PA view of the chest and 3 oblique views of the right ribs demonstrate normal  heart size. There is a trace right pleural effusion with underlying atelectasis. No fracture or pneumothorax. Impression:Trace right pleural effusion with underlying atelectasis. No acute rib fracture. 2-3-2020: Mammogram Bilateral: Report still pending (I have asked my nurse to call to get final dictation on this asap)     1-4-2020: [facility] seen for c/o abdominal pain, urine done there that day was negative. KUB showed large stool, otherwise negative.      New Recommendations:  -Chronic cough counseling again at length w/ pt today: Allergic, Variant Asthma, Post nasal drainage, Silent reflux, Obstructive symptom, Lung Disease  -Start w/ more workup and initially a trial of Symbicort 80-4.5, ii puffs bid and Zyrtec 10 mg qpm  -Check Thyroid/Parathyroid/Soft Tissue Neck US  -Check on results of mammogram done 2-3-2020  -She has already made adjustments in thyroid dose from last visit and is scheduled for follow up w/ me again 3-3-2020.   -Follow up lab orders for thyroid, calcium, parathyroid testing already pended in system for her next appt 3-3-2020  -Encouraged her to keep that follow up appt o reassess all the above as well  -If inconclusive and symptoms not improved, would recommend CT Chest & Abdomen    Spent >50% of today's 40 min encounter reviewing prior hx, current workup, differential dx, mgt and follow up plan, and coordination of care plan poor balance

## 2023-05-01 NOTE — ED ADULT NURSE NOTE - CHPI ED SYMPTOMS POS
VAGINAL BLEEDING/ABDOMINAL PAIN Siliq Counseling:  I discussed with the patient the risks of Siliq including but not limited to new or worsening depression, suicidal thoughts and behavior, immunosuppression, malignancy, posterior leukoencephalopathy syndrome, and serious infections.  The patient understands that monitoring is required including a PPD at baseline and must alert us or the primary physician if symptoms of infection or other concerning signs are noted. There is also a special program designed to monitor depression which is required with Siliq.

## 2023-07-11 NOTE — PHYSICAL THERAPY INITIAL EVALUATION ADULT - IMPAIRMENTS FOUND, PT EVAL
Patient does have asthma however she has had no recent exacerbations and appears to be stable and well-controlled  
Patient recently got labs and LDL cholesterol is again elevated.  We discussed a low-fat diet and she will continue to work on this.  Recently she had a steroid injection in her knee and she is able to exercise again so she is hoping that will help as well.  
Pressure is stable and well-controlled so no changes will be made and she will stay on lisinopril 20 mg daily  
poor safety awareness/fine motor/ROM/aerobic capacity/endurance/muscle strength/arousal, attention, and cognition/cognitive impairment/posture/ergonomics and body mechanics/gross motor/gait, locomotion, and balance

## 2023-07-24 NOTE — H&P ADULT - NSHPREVIEWOFSYSTEMS_GEN_ALL_CORE
Review of system: as per HPI  General: No malaise, fever, chills.  Eyes: No eye pain, visual disturbances, or discharge  ENMT:  No difficulty hearing, tinnitus, vertigo; No sinus or throat pain  Neck: No pain or stiffness  Respiratory: No cough, wheezing, chills or hemoptysis  Cardiovascular: No chest pain, palpitations, shortness of breath, dizziness or leg swelling  Gastrointestinal: No abdominal or epigastric pain. no nausea, vomiting or hematemesis;  no diarrhea or constipation. No melena or hematochezia.  Genitourinary: No dysuria, frequency, hematuria or incontinence [Time Spent: ___ minutes] : I have spent [unfilled] minutes of time on the encounter.

## 2023-10-25 NOTE — ED ADULT NURSE NOTE - CHIEF COMPLAINT
The patient is a 89y Female complaining of
Post-Care Instructions: I reviewed with the patient in detail post-care instructions. Patient is to wear sunprotection, and avoid picking at any of the treated lesions. Pt may apply Vaseline to crusted or scabbing areas.
Show Spray Paint Technique Variable?: Yes
Spray Paint Text: The liquid nitrogen was applied to the skin utilizing a spray paint frosting technique.
Add 52 Modifier (Optional): no
Consent: The patient's consent was obtained including but not limited to risks of crusting, scabbing, blistering, scarring, darker or lighter pigmentary change, recurrence, incomplete removal and infection.
Number Of Freeze-Thaw Cycles: 2 freeze-thaw cycles
Medical Necessity Information: It is in your best interest to select a reason for this procedure from the list below. All of these items fulfill various CMS LCD requirements except the new and changing color options.
Medical Necessity Clause: This procedure was medically necessary because the lesions that were treated were:
Detail Level: Detailed
Duration Of Freeze Thaw-Cycle (Seconds): 0

## 2024-11-05 NOTE — PROGRESS NOTE ADULT - SUBJECTIVE AND OBJECTIVE BOX
Addended by: JAVI THAKKAR on: 11/5/2024 01:51 PM     Modules accepted: Orders     Chief Complaint/Reason for Consult: cv mgmt  INTERVAL HPI: no acute events overnight  	  MEDICATIONS:        haloperidol    Injectable 1 milliGRAM(s) IntraMuscular every 4 hours PRN  OLANZapine 2.5 milliGRAM(s) Oral with breakfast  OLANZapine 2.5 milliGRAM(s) Oral at bedtime        multivitamin 1 Tablet(s) Oral daily  folic acid 1 milliGRAM(s) Oral daily  heparin  Injectable 5000 Unit(s) SubCutaneous every 8 hours      REVIEW OF SYSTEMS:  [x] As per HPI  CONSTITUTIONAL: No fever, weight loss, or fatigue  RESPIRATORY: No cough, wheezing, chills or hemoptysis; No Shortness of Breath  CARDIOVASCULAR: No chest pain, palpitations, dizziness, or leg swelling  GASTROINTESTINAL: No abdominal or epigastric pain. No nausea, vomiting, or hematemesis; No diarrhea or constipation. No melena or hematochezia.  MUSCULOSKELETAL: No joint pain or swelling; No muscle, back, or extremity pain  [x] All others negative	  [ ] Unable to obtain    PHYSICAL EXAM:  T(C): 36.6 (08-01-17 @ 05:19), Max: 36.6 (07-31-17 @ 16:49)  HR: 86 (08-01-17 @ 05:19) (86 - 98)  BP: 145/80 (08-01-17 @ 05:19) (97/64 - 145/80)  RR: 15 (08-01-17 @ 05:19) (15 - 16)  SpO2: 97% (08-01-17 @ 05:19) (94% - 97%)  Wt(kg): --  I&O's Summary        Appearance: Normal	  HEENT:   Normal oral mucosa  Cardiovascular: Normal S1 S2, No JVD, No murmurs, No edema  Respiratory: Lungs clear to auscultation	  Gastrointestinal:  Soft, Non-tender, + BS	  Extremities: Normal range of motion, No clubbing, cyanosis or edema  Vascular: Peripheral pulses palpable 2+ bilaterally    TELEMETRY: 	    ECG:    	  RADIOLOGY:   CXR:  CT:  US:    CARDIAC TESTING:  Echocardiogram:  Catheterization:  Stress Test:      LABS:	 	    CARDIAC MARKERS:                    proBNP:   Lipid Profile:   HgA1c:   TSH:     ASSESSMENT/PLAN: 	  hx of Syncope - mechanical, fell twice in bathroom prior admissions.    Exam with holosystolic murmur - echo with mild AI preserved EF mild inf wall HK, no change in medical mgmt.  Hydration, PT, SW per primary team  replete electrolytes

## 2025-05-29 NOTE — BEHAVIORAL HEALTH ASSESSMENT NOTE - NSBHSUICRISKFACTOR_PSY_A_CORE
HISTORY OF PRESENT ILLNESS:  Cecile is a 89 year old female who presents for:  1. Essential hypertension, benign    2. Prediabetes    3. Other hyperlipidemia    4. Hematuria, microscopic: declined uro ref    5. Medication management    6. Exercise counseling      No gross hematuria or other  sx.    She is taking her medications without any problems.  She has not had any endocrine concerns.  She has not had any cardiovascular or pulmonary symptoms.      Past Medical History:   Diagnosis Date   • Advanced directives, counseling/discussion: pt to bring copy 2015   • Anemia: cscope 2015   • DNR (do not resuscitate) for long-term issue 2015    Full code for temporary concerns    • Elevated serum protein level: spep, heme 2015   • Epiretinal membrane, left eye    • Esophageal reflux 2015    [facility]   • Essential (primary) hypertension    • Essential hypertension, benign 1/15/2014   • History of colon cancer: 1989: cscope due 2015 1/15/2014   • Hx of colonoscopy 2015    History of colon ca, history of polyps repeat colonoscopy in 5 years Dr Barron    • Malignant neoplasm  (CMD)     colon cancer   • Osteopenia: repeat bmd in 2015 1/15/2014   • Osteoporosis: new dx:  recheck bmd  yr 2015   • Other and unspecified hyperlipidemia 3/25/2014   • Prediabetes 3/25/2014   • Retinal detachment of right eye with retinal break    • Sciatica 11/15/2013    Declined lumbar xray until 3/2014.    • Wears dentures    • Wears glasses        SOCIAL HISTORY:   Social History     Tobacco Use   • Smoking status: Former     Current packs/day: 0.00     Types: Cigarettes     Quit date: 1972     Years since quittin.5   • Smokeless tobacco: Never   Substance Use Topics   • Alcohol use: No       ALLERGIES:  No Known Allergies    Current Outpatient Medications   Medication Sig   • enalapril (VASOTEC) 20 MG tablet Take 1 tablet by mouth daily.   • MAGNESIUM-CALCIUM-FOLIC ACID PO    • B Complex  Vitamins (VITAMIN B COMPLEX PO)    • Multiple Vitamins-Minerals (DAILY MULTI) Tab    • Cholecalciferol (VITAMIN D) 2000 UNITS tablet Take 2,000 Units by mouth daily.   • calcium citrate-vit D (CITRACAL + D) 315-250 MG-UNIT per tablet Take 1 tablet by mouth daily.     No current facility-administered medications for this visit.       PHYSICAL EXAMINATION:  VITAL SIGNS:  Visit Vitals  /60   Pulse 63   Ht 5' 3\" (1.6 m)   Wt 57.7 kg (127 lb 1.6 oz)   SpO2 99%   BMI 22.51 kg/m²     GENERAL:  Well-developed, well-nourished female, in no acute distress.  She is alert and oriented x 3 with a normal affect.   HEENT:  Extraocular muscles are intact.  Sclerae are anicteric.  Conjunctivae are clear. Nares are without rhinorrhea.  Mucous membranes are moist.   CARDIOVASCULAR:  Regular rate and rhythm without murmurs, rubs or gallops.   LUNGS:  Clear to auscultation bilaterally with good aeration and normal respiratory effort, without retractions appreciated.  LOWER EXTREMITIES:  Without edema.      Previous PNs/studies/Labs per Epic reviewed and discussed    ASSESSMENT:    1. Essential hypertension, benign    2. Prediabetes    3. Other hyperlipidemia    4. Hematuria, microscopic: declined uro ref    5. Medication management    6. Exercise counseling        Recent PHQ 2/9 Score    PHQ 2:  PHQ 2 Score Adult PHQ 2 Score Adult PHQ 2 Interpretation Little interest or pleasure in activity?   5/29/2025   8:54 AM 0 No further screening needed 0       PHQ 9:        DEPRESSION ASSESSMENT/PLAN:  Depression screening is negative no further plan needed.     PLAN:  Return if symptoms worsen or fail to improve, for [facility] slot in Nov 20    2. fasting labs .  Follow up as needed, if symptoms worsen, or fail to improve.   No orders of the defined types were placed in this encounter.        MEDICAL DECISION MAKING:      Looking at above DX, TX plan:  R/B of TX option(s) and existing therapies:    Declines uro ref.    Notify us if gross  hematuria:  she agrees.    Sh:  care giving to  son:  CVA and sis: dementia.    Above chronic conditions are improved/stable with RX/lifestyle changes that I and care team manage.       As discussed in detail and based on:    Evaluation of this patient included preview of chart, review of the Medications, Allergies, Problem list, Past Medical History, Past Surgical History, Social History and Family History.      Social determinants of health are pertinent to MDM for this patient: see demographics and: ethnicity, SES/physical environment/zip code, health behaviors including healthcare literacy, healthcare access.    Previous germane PNs/studies/Labs per Epic were also reviewed and discussed    This evaluation included review of chart//completion of charting on day of encounter/counseling, on the day of this encounter and coordination of care and answering all of the patient's questions in detail.     Please follow up for symptoms that persist/worsen as discussed and 911 or ER for any severe symptoms.    Patient is instructed to email me to discuss test results via [facility] 48 hours after the tests are done unless otherwise requested or call if not received results in 2 weeks of them being completed.     Continue medicines per Epic as applicable.    Please see AVS/PATIENT INSTRUCTIONS for other details of the encounter.       Mood episode